# Patient Record
Sex: MALE | Race: WHITE
[De-identification: names, ages, dates, MRNs, and addresses within clinical notes are randomized per-mention and may not be internally consistent; named-entity substitution may affect disease eponyms.]

---

## 2021-11-01 ENCOUNTER — HOSPITAL ENCOUNTER (INPATIENT)
Dept: HOSPITAL 47 - 5NMEDONC | Age: 86
LOS: 8 days | Discharge: NURSING, REHAB OR LONG-TERM CARE | DRG: 256 | End: 2021-11-09
Attending: INTERNAL MEDICINE | Admitting: INTERNAL MEDICINE
Payer: MEDICARE

## 2021-11-01 VITALS — BODY MASS INDEX: 32.1 KG/M2

## 2021-11-01 DIAGNOSIS — G62.9: ICD-10-CM

## 2021-11-01 DIAGNOSIS — L03.031: ICD-10-CM

## 2021-11-01 DIAGNOSIS — Z79.84: ICD-10-CM

## 2021-11-01 DIAGNOSIS — Z95.5: ICD-10-CM

## 2021-11-01 DIAGNOSIS — B95.61: ICD-10-CM

## 2021-11-01 DIAGNOSIS — I11.0: ICD-10-CM

## 2021-11-01 DIAGNOSIS — G89.29: ICD-10-CM

## 2021-11-01 DIAGNOSIS — Z79.51: ICD-10-CM

## 2021-11-01 DIAGNOSIS — I25.2: ICD-10-CM

## 2021-11-01 DIAGNOSIS — F45.42: ICD-10-CM

## 2021-11-01 DIAGNOSIS — I48.91: ICD-10-CM

## 2021-11-01 DIAGNOSIS — L98.9: ICD-10-CM

## 2021-11-01 DIAGNOSIS — Z85.46: ICD-10-CM

## 2021-11-01 DIAGNOSIS — E11.69: ICD-10-CM

## 2021-11-01 DIAGNOSIS — Z79.1: ICD-10-CM

## 2021-11-01 DIAGNOSIS — I50.9: ICD-10-CM

## 2021-11-01 DIAGNOSIS — Z20.822: ICD-10-CM

## 2021-11-01 DIAGNOSIS — E11.628: ICD-10-CM

## 2021-11-01 DIAGNOSIS — M19.90: ICD-10-CM

## 2021-11-01 DIAGNOSIS — E11.649: ICD-10-CM

## 2021-11-01 DIAGNOSIS — Z79.82: ICD-10-CM

## 2021-11-01 DIAGNOSIS — Z95.1: ICD-10-CM

## 2021-11-01 DIAGNOSIS — F41.9: ICD-10-CM

## 2021-11-01 DIAGNOSIS — E11.52: Primary | ICD-10-CM

## 2021-11-01 DIAGNOSIS — F32.9: ICD-10-CM

## 2021-11-01 DIAGNOSIS — I25.10: ICD-10-CM

## 2021-11-01 DIAGNOSIS — N17.9: ICD-10-CM

## 2021-11-01 DIAGNOSIS — Z79.899: ICD-10-CM

## 2021-11-01 DIAGNOSIS — Z95.0: ICD-10-CM

## 2021-11-01 DIAGNOSIS — E11.65: ICD-10-CM

## 2021-11-01 DIAGNOSIS — R45.851: ICD-10-CM

## 2021-11-01 DIAGNOSIS — M86.9: ICD-10-CM

## 2021-11-01 DIAGNOSIS — B95.2: ICD-10-CM

## 2021-11-01 DIAGNOSIS — B96.89: ICD-10-CM

## 2021-11-01 LAB — GLUCOSE BLD-MCNC: 250 MG/DL (ref 75–99)

## 2021-11-01 PROCEDURE — 85025 COMPLETE CBC W/AUTO DIFF WBC: CPT

## 2021-11-01 PROCEDURE — 87186 SC STD MICRODIL/AGAR DIL: CPT

## 2021-11-01 PROCEDURE — 87205 SMEAR GRAM STAIN: CPT

## 2021-11-01 PROCEDURE — 84100 ASSAY OF PHOSPHORUS: CPT

## 2021-11-01 PROCEDURE — 80048 BASIC METABOLIC PNL TOTAL CA: CPT

## 2021-11-01 PROCEDURE — 80162 ASSAY OF DIGOXIN TOTAL: CPT

## 2021-11-01 PROCEDURE — 80053 COMPREHEN METABOLIC PANEL: CPT

## 2021-11-01 PROCEDURE — 87635 SARS-COV-2 COVID-19 AMP PRB: CPT

## 2021-11-01 PROCEDURE — 87077 CULTURE AEROBIC IDENTIFY: CPT

## 2021-11-01 PROCEDURE — 87075 CULTR BACTERIA EXCEPT BLOOD: CPT

## 2021-11-01 PROCEDURE — 82565 ASSAY OF CREATININE: CPT

## 2021-11-01 PROCEDURE — 83735 ASSAY OF MAGNESIUM: CPT

## 2021-11-01 PROCEDURE — 87040 BLOOD CULTURE FOR BACTERIA: CPT

## 2021-11-01 PROCEDURE — 85610 PROTHROMBIN TIME: CPT

## 2021-11-01 PROCEDURE — 87070 CULTURE OTHR SPECIMN AEROBIC: CPT

## 2021-11-01 RX ADMIN — GLIMEPIRIDE SCH MG: 4 TABLET ORAL at 21:59

## 2021-11-01 RX ADMIN — INSULIN ASPART SCH UNIT: 100 INJECTION, SOLUTION INTRAVENOUS; SUBCUTANEOUS at 22:00

## 2021-11-01 RX ADMIN — MORPHINE SULFATE SCH MG: 15 TABLET, EXTENDED RELEASE ORAL at 22:00

## 2021-11-01 RX ADMIN — AMPICILLIN SODIUM AND SULBACTAM SODIUM SCH MLS/HR: 2; 1 INJECTION, POWDER, FOR SOLUTION INTRAMUSCULAR; INTRAVENOUS at 22:32

## 2021-11-01 RX ADMIN — PREGABALIN SCH MG: 75 CAPSULE ORAL at 22:00

## 2021-11-01 RX ADMIN — HEPARIN SODIUM SCH UNIT: 5000 INJECTION INTRAVENOUS; SUBCUTANEOUS at 22:00

## 2021-11-02 LAB
ALBUMIN SERPL-MCNC: 3.4 G/DL (ref 3.5–5)
ALBUMIN/GLOB SERPL: 1.2 {RATIO}
ALP SERPL-CCNC: 40 U/L (ref 38–126)
ALT SERPL-CCNC: 13 U/L (ref 4–49)
ANION GAP SERPL CALC-SCNC: 7 MMOL/L
AST SERPL-CCNC: 29 U/L (ref 17–59)
BASOPHILS # BLD AUTO: 0 K/UL (ref 0–0.2)
BASOPHILS NFR BLD AUTO: 0 %
BUN SERPL-SCNC: 44 MG/DL (ref 9–20)
CALCIUM SPEC-MCNC: 9 MG/DL (ref 8.4–10.2)
CHLORIDE SERPL-SCNC: 98 MMOL/L (ref 98–107)
CO2 SERPL-SCNC: 30 MMOL/L (ref 22–30)
EOSINOPHIL # BLD AUTO: 0.1 K/UL (ref 0–0.7)
EOSINOPHIL NFR BLD AUTO: 1 %
ERYTHROCYTE [DISTWIDTH] IN BLOOD BY AUTOMATED COUNT: 4.77 M/UL (ref 4.3–5.9)
ERYTHROCYTE [DISTWIDTH] IN BLOOD: 14.2 % (ref 11.5–15.5)
GLOBULIN SER CALC-MCNC: 2.9 G/DL
GLUCOSE BLD-MCNC: 123 MG/DL (ref 75–99)
GLUCOSE BLD-MCNC: 182 MG/DL (ref 75–99)
GLUCOSE BLD-MCNC: 228 MG/DL (ref 75–99)
GLUCOSE BLD-MCNC: 56 MG/DL (ref 75–99)
GLUCOSE BLD-MCNC: 61 MG/DL (ref 75–99)
GLUCOSE BLD-MCNC: 62 MG/DL (ref 75–99)
GLUCOSE BLD-MCNC: 62 MG/DL (ref 75–99)
GLUCOSE BLD-MCNC: 91 MG/DL (ref 75–99)
GLUCOSE SERPL-MCNC: 114 MG/DL (ref 74–99)
HCT VFR BLD AUTO: 40.2 % (ref 39–53)
HGB BLD-MCNC: 13 GM/DL (ref 13–17.5)
INR PPP: 1.2 (ref ?–1.2)
LYMPHOCYTES # SPEC AUTO: 0.9 K/UL (ref 1–4.8)
LYMPHOCYTES NFR SPEC AUTO: 14 %
MAGNESIUM SPEC-SCNC: 2.1 MG/DL (ref 1.6–2.3)
MCH RBC QN AUTO: 27.3 PG (ref 25–35)
MCHC RBC AUTO-ENTMCNC: 32.4 G/DL (ref 31–37)
MCV RBC AUTO: 84.3 FL (ref 80–100)
MONOCYTES # BLD AUTO: 0.4 K/UL (ref 0–1)
MONOCYTES NFR BLD AUTO: 7 %
NEUTROPHILS # BLD AUTO: 4.7 K/UL (ref 1.3–7.7)
NEUTROPHILS NFR BLD AUTO: 75 %
PLATELET # BLD AUTO: 131 K/UL (ref 150–450)
POTASSIUM SERPL-SCNC: 3.8 MMOL/L (ref 3.5–5.1)
PROT SERPL-MCNC: 6.3 G/DL (ref 6.3–8.2)
PT BLD: 12.7 SEC (ref 9–12)
SODIUM SERPL-SCNC: 135 MMOL/L (ref 137–145)
WBC # BLD AUTO: 6.2 K/UL (ref 3.8–10.6)

## 2021-11-02 PROCEDURE — 0Y6P0Z0 DETACHMENT AT RIGHT 1ST TOE, COMPLETE, OPEN APPROACH: ICD-10-PCS

## 2021-11-02 RX ADMIN — FENOFIBRATE SCH MG: 160 TABLET ORAL at 09:52

## 2021-11-02 RX ADMIN — AMPICILLIN SODIUM AND SULBACTAM SODIUM SCH MLS/HR: 2; 1 INJECTION, POWDER, FOR SOLUTION INTRAMUSCULAR; INTRAVENOUS at 09:06

## 2021-11-02 RX ADMIN — PANTOPRAZOLE SODIUM SCH: 40 TABLET, DELAYED RELEASE ORAL at 10:19

## 2021-11-02 RX ADMIN — GLIMEPIRIDE SCH MG: 4 TABLET ORAL at 20:52

## 2021-11-02 RX ADMIN — Medication SCH MCG: at 09:55

## 2021-11-02 RX ADMIN — INSULIN ASPART SCH: 100 INJECTION, SOLUTION INTRAVENOUS; SUBCUTANEOUS at 09:05

## 2021-11-02 RX ADMIN — MORPHINE SULFATE SCH: 15 TABLET, EXTENDED RELEASE ORAL at 20:52

## 2021-11-02 RX ADMIN — ATORVASTATIN CALCIUM SCH MG: 20 TABLET, FILM COATED ORAL at 09:48

## 2021-11-02 RX ADMIN — INSULIN ASPART SCH UNIT: 100 INJECTION, SOLUTION INTRAVENOUS; SUBCUTANEOUS at 21:13

## 2021-11-02 RX ADMIN — AMPICILLIN SODIUM AND SULBACTAM SODIUM SCH MLS/HR: 2; 1 INJECTION, POWDER, FOR SOLUTION INTRAMUSCULAR; INTRAVENOUS at 20:50

## 2021-11-02 RX ADMIN — INSULIN ASPART SCH: 100 INJECTION, SOLUTION INTRAVENOUS; SUBCUTANEOUS at 13:35

## 2021-11-02 RX ADMIN — MORPHINE SULFATE SCH MG: 15 TABLET, EXTENDED RELEASE ORAL at 09:50

## 2021-11-02 RX ADMIN — TAMSULOSIN HYDROCHLORIDE SCH MG: 0.4 CAPSULE ORAL at 09:56

## 2021-11-02 RX ADMIN — DIGOXIN SCH MCG: 125 TABLET ORAL at 09:51

## 2021-11-02 RX ADMIN — SODIUM CHLORIDE SCH MLS/HR: 9 INJECTION, SOLUTION INTRAVENOUS at 22:36

## 2021-11-02 RX ADMIN — PREGABALIN SCH MG: 75 CAPSULE ORAL at 09:52

## 2021-11-02 RX ADMIN — INSULIN ASPART SCH: 100 INJECTION, SOLUTION INTRAVENOUS; SUBCUTANEOUS at 17:44

## 2021-11-02 RX ADMIN — GLIMEPIRIDE SCH MG: 4 TABLET ORAL at 09:55

## 2021-11-02 RX ADMIN — PREGABALIN SCH MG: 75 CAPSULE ORAL at 20:51

## 2021-11-02 RX ADMIN — HEPARIN SODIUM SCH UNIT: 5000 INJECTION INTRAVENOUS; SUBCUTANEOUS at 20:51

## 2021-11-02 RX ADMIN — HEPARIN SODIUM SCH: 5000 INJECTION INTRAVENOUS; SUBCUTANEOUS at 07:33

## 2021-11-02 NOTE — OP
OPERATIVE REPORT



PREOPERATIVE DIAGNOSIS:

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



POSTOPERATIVE DIAGNOSIS:

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



PROCEDURE:

Amputation of the right foot big toe at metatarsophalangeal joint.



ANESTHESIA:

IV sedation and local anesthesia.



PROCEDURE DESCRIPTION:

This patient was brought to the operating room.  Right foot was prepped and draped in

usual sterile manner.  This patient has a chronic wound on the right foot big toe with

gangrene changes involving the big toe. Lidocaine plain 1% plain was infiltrated for

the ring block and incision was made, elliptical, on the dorsal aspect of the foot,

went circumferentially around the plantar aspect of the foot, deepened through skin,

fat and fascia.  Then we divided the tendons on the plantar aspect and then on the

dorsal aspect. The big toe was kept in flexion position until we reached the

metatarsophalangeal joint, and ligaments were divided and the big toe was removed.

There were some bleeding points which were suture-ligated with 4-0 Prolene.  Wound was

copiously irrigated with hydrogen peroxide and saline. Incision was closed in 2 layers

using 0 Vicryl and skin was closed with 3-0 nylon with interrupted suture. Dressing was

applied.  The patient tolerated the procedure well and was transferred to the recovery

room in satisfactory condition.





MMODL / IJN: [id number] / Job#: [id number]

## 2021-11-02 NOTE — HP
HISTORY AND PHYSICAL



ADDENDUM TO HISTORY AND PHYSICAL:

ADDENDUM: Right foot gangrene for right foot big toe amputation.





MMODL / IJN: [id number] / Job#: [id number]

## 2021-11-02 NOTE — P.GSCN
History of Present Illness


History of present illness: 





86-year-old gentleman who is been admitted to the hospital with gangrene of the 

left foot big toe.  According to patient he was treated for wound on his left 

big toe at the wound clinic for the last few months is getting worse patient has

been admitted I was consulted for evaluation and treatment his left big toe has 

a gangrene of the some redness on the dorsal aspect of the foot





Medical history history of coronary artery disease patient had a CABG and stent 

placed in the past





On examination neck is supple no bruit appreciated





Chest is clear first and second sound normal good and both lungs





Abdomen is soft non-tender





Vascular examination brachial radial and femoral pulses are present dorsal pedis

is present patient has a left big toe gangrene with some redness noted on the d

orsal suspect the foot





Plan is left big toe amputation risk and complication discussed we will arrange 

for surgery keep patient nothing by mouth





Past Medical History


Past Medical History: Heart Failure, Diabetes Mellitus, Eye Disorder, 

Hypertension, Myocardial Infarction (MI), Prostate Disorder, Skin Disorder


Additional Past Medical History / Comment(s): MI's, stent, nueropathy, prostate 

cancer, cellulitis, chronic back pain


Last Myocardial Infarction Date:: Pt unaware


History of Any Multi-Drug Resistant Organisms: None Reported


Past Surgical History: Back Surgery, Cholecystectomy, Heart Catheterization, 

Heart Catheterization With Stent, Orthopedic Surgery, Pacemaker, Tonsillectomy


Additional Past Surgical History / Comment(s): stents, pacemaker w/def


Past Anesthesia/Blood Transfusion Reactions: No Reported Reaction


Date of Last Stent Placement:: 3/18/21


Type of Cardiac Device: Permanent Pacemaker


Device Placement Date:: 11/10/17


Past Psychological History: Depression


Smoking Status: Never smoker


Past Alcohol Use History: None Reported


Past Drug Use History: None Reported





Medications and Allergies


                                Home Medications











 Medication  Instructions  Recorded  Confirmed  Type


 


Aspirin 81 mg PO DAILY 11/01/21 11/01/21 History


 


Blue Emu Otc Cream 1 applic TOPICAL DAILY PRN 11/01/21 11/01/21 History


 


Bumetanide [BUMEX] 0.5 mg PO DAILY 11/01/21 11/01/21 History


 


Celecoxib [CeleBREX] 100 mg PO BID PRN 11/01/21 11/01/21 History


 


Cholecalciferol [Vitamin D3 (25 25 mcg PO DAILY 11/01/21 11/01/21 History





Mcg = 1000 Iu)]    


 


DULoxetine HCL [Cymbalta] 30 mg PO DAILY 11/01/21 11/01/21 History


 


Digoxin [Lanoxin] 125 mcg PO DAILY 11/01/21 11/01/21 History


 


Fenofibric Acid (Choline) 135 mg PO DAILY 11/01/21 11/01/21 History





[Trilipix]    


 


Glimepiride [Amaryl] 4 mg PO BID 11/01/21 11/01/21 History


 


Lidocaine 5% Patch [Lidoderm] 1 patch TOPICAL DAILY PRN 11/01/21 11/01/21 

History


 


Mometasone/Formoterol [Dulera 100 1 puff PO RT-BID 11/01/21 11/01/21 History





Mcg-5 Mcg Inhaler]    


 


Morphine Sulfate 15 mg PO BID 11/01/21 11/01/21 History


 


Multivitamins, Thera [Multivitamin 1 tab PO DAILY 11/01/21 11/01/21 History





(formulary)]    


 


Pantoprazole [Protonix] 40 mg PO DAILY 11/01/21 11/01/21 History


 


Pregabalin [Lyrica] 75 mg PO BID 11/01/21 11/01/21 History


 


Rosuvastatin [Crestor] 10 mg PO DAILY 11/01/21 11/01/21 History


 


Tamsulosin HCl [Flomax] 0.4 mg PO DAILY 11/01/21 11/01/21 History


 


carvediloL [Coreg] 3.125 mg PO BID 11/01/21 11/01/21 History


 


traZODone HCL [TraZODone HCl] 50 mg PO HS 11/01/21 11/01/21 History








                                    Allergies











Allergy/AdvReac Type Severity Reaction Status Date / Time


 


contrast dye Allergy  Unknown Uncoded 11/01/21 21:04














Surgical - Exam


                                   Vital Signs











Temp Pulse Resp BP Pulse Ox


 


 98.5 F   81   18   125/67   96 


 


 11/01/21 18:11  11/01/21 18:11  11/01/21 18:11  11/01/21 18:11  11/01/21 18:11














Results





- Labs





                                 11/02/21 03:57





                                 11/02/21 03:57


                  Abnormal Lab Results - Last 24 Hours (Table)











  11/01/21 11/01/21 11/02/21 Range/Units





  19:07 20:27 03:57 


 


Plt Count    131 L  (150-450)  k/uL


 


Lymphocytes #    0.9 L  (1.0-4.8)  k/uL


 


PT     (9.0-12.0)  sec


 


INR     (<1.2)  


 


Sodium     (137-145)  mmol/L


 


BUN     (9-20)  mg/dL


 


Creatinine  1.51 H    (0.66-1.25)  mg/dL


 


Glucose     (74-99)  mg/dL


 


POC Glucose (mg/dL)   250 H   (75-99)  mg/dL


 


Albumin     (3.5-5.0)  g/dL














  11/02/21 11/02/21 Range/Units





  03:57 03:57 


 


Plt Count    (150-450)  k/uL


 


Lymphocytes #    (1.0-4.8)  k/uL


 


PT  12.7 H   (9.0-12.0)  sec


 


INR  1.2 H   (<1.2)  


 


Sodium   135 L  (137-145)  mmol/L


 


BUN   44 H  (9-20)  mg/dL


 


Creatinine   1.62 H  (0.66-1.25)  mg/dL


 


Glucose   114 H  (74-99)  mg/dL


 


POC Glucose (mg/dL)    (75-99)  mg/dL


 


Albumin   3.4 L  (3.5-5.0)  g/dL








                                 Diabetes panel











  11/01/21 11/02/21 Range/Units





  19:07 03:57 


 


Sodium   135 L  (137-145)  mmol/L


 


Potassium   3.8  (3.5-5.1)  mmol/L


 


Chloride   98  ()  mmol/L


 


Carbon Dioxide   30  (22-30)  mmol/L


 


BUN   44 H  (9-20)  mg/dL


 


Creatinine  1.51 H  1.62 H  (0.66-1.25)  mg/dL


 


Glucose   114 H  (74-99)  mg/dL


 


Calcium   9.0  (8.4-10.2)  mg/dL


 


AST   29  (17-59)  U/L


 


ALT   13  (4-49)  U/L


 


Alkaline Phosphatase   40  ()  U/L


 


Total Protein   6.3  (6.3-8.2)  g/dL


 


Albumin   3.4 L  (3.5-5.0)  g/dL








                                  Calcium panel











  11/02/21 Range/Units





  03:57 


 


Calcium  9.0  (8.4-10.2)  mg/dL


 


Phosphorus  3.0  (2.5-4.5)  mg/dL


 


Albumin  3.4 L  (3.5-5.0)  g/dL








                                 Pituitary panel











  11/01/21 11/02/21 Range/Units





  19:07 03:57 


 


Sodium   135 L  (137-145)  mmol/L


 


Potassium   3.8  (3.5-5.1)  mmol/L


 


Chloride   98  ()  mmol/L


 


Carbon Dioxide   30  (22-30)  mmol/L


 


BUN   44 H  (9-20)  mg/dL


 


Creatinine  1.51 H  1.62 H  (0.66-1.25)  mg/dL


 


Glucose   114 H  (74-99)  mg/dL


 


Calcium   9.0  (8.4-10.2)  mg/dL








                                  Adrenal panel











  11/01/21 11/02/21 Range/Units





  19:07 03:57 


 


Sodium   135 L  (137-145)  mmol/L


 


Potassium   3.8  (3.5-5.1)  mmol/L


 


Chloride   98  ()  mmol/L


 


Carbon Dioxide   30  (22-30)  mmol/L


 


BUN   44 H  (9-20)  mg/dL


 


Creatinine  1.51 H  1.62 H  (0.66-1.25)  mg/dL


 


Glucose   114 H  (74-99)  mg/dL


 


Calcium   9.0  (8.4-10.2)  mg/dL


 


Total Bilirubin   1.2  (0.2-1.3)  mg/dL


 


AST   29  (17-59)  U/L


 


ALT   13  (4-49)  U/L


 


Alkaline Phosphatase   40  ()  U/L


 


Total Protein   6.3  (6.3-8.2)  g/dL


 


Albumin   3.4 L  (3.5-5.0)  g/dL

## 2021-11-02 NOTE — P.HPIM
History of Present Illness


H&P Date: 11/02/21





This is an 86-year-old male who was recently admitted to [facility] for 

increasing pain of the right great toe and concern for cellulitis with gangrene.

 Patient was transferred here to [facility] for further evaluation and 

initially started on IV Unasyn and will continue with Unasyn and vancomycin and 

consult infectious disease.  Vascular surgery Dr. Champagne also consulted for 

possible amputation of the right great toe.  Patient states he has been 

following at the wound care center in [place] who has been treating the foot 

infection and has progressively gotten worse in transferred here for further 

evaluation.  Patient follows with  in the outpatient setting.  Juany keller has a past medical history of heart failure, atrial fibrillation, CAD, 

arthritis, anxiety, depression diabetes mellitus type 2, I disorder, 

hypertension, myocardial infarction with stents in March 2021, permanent 

pacemaker placement in 2017 prostate cancer, and chronic back pain. patient also

follows at the pain clinic every 2 months for his chronic back pain and is 

prescribed MS Contin.  Patient denies having any right foot pain but his 

continued with chronic back pain during this hospitalization.  Right great toe 

is positive for necrotic tissue and eschar noted along with some surrounding 

cellulitis with redness and inflammation that is extending up the dorsal aspect 

of the right foot and stops midfoot.  Plan is for amputation of the right great 

toe with vascular surgery this afternoon.  Initial labs show a white blood count

of 6.2, hemoglobin is 13.0, platelets are 131, INR is 1.2, sodium is 135, 

potassium is 3.8, creatinine is 1.62, and BUN is 44, calcium is 9.0, phosphorus 

is 3.0, magnesium is 2.1, liver functions within normal limits.





Review of Systems


Constitutional: Denies chills, Denies fever


Eyes: bilateral decreased vision (Cataracts)


Ears, nose, mouth and throat: Denies headache, Denies sore throat


Cardiovascular: Denies chest pain, Denies shortness of breath


Respiratory: Denies cough


Gastrointestinal: Denies abdominal pain, Denies diarrhea, Denies nausea, Denies 

vomiting


Musculoskeletal: Reports low back pain (Chronic back pain, sees pain management 

outpatient)


Integumentary: Reports color changes, Reports darkening of skin, Reports 

foot/leg ulcers


Neurological: Denies numbness, Denies weakness


Psychiatric: Denies anxiety, Denies depression


Endocrine: Denies fatigue, Denies weight change





Past Medical History


Past Medical History: Cancer, Heart Failure, Diabetes Mellitus, Eye Disorder, 

Hypertension, Myocardial Infarction (MI), Prostate Disorder, Skin Disorder


Additional Past Medical History / Comment(s): MI's, stent, nueropathy, prostate 

cancer, cellulitis, chronic back pain


Last Myocardial Infarction Date:: Pt unaware


History of Any Multi-Drug Resistant Organisms: None Reported


Past Surgical History: Back Surgery, Cholecystectomy, Heart Catheterization, 

Heart Catheterization With Stent, Orthopedic Surgery, Pacemaker, Tonsillectomy


Additional Past Surgical History / Comment(s): stents, pacemaker w/def


Past Anesthesia/Blood Transfusion Reactions: No Reported Reaction


Date of Last Stent Placement:: 3/18/21


Type of Cardiac Device: Permanent Pacemaker


Device Placement Date:: 11/10/17


Past Psychological History: Depression


Smoking Status: Never smoker


Past Alcohol Use History: None Reported


Past Drug Use History: None Reported





Medications and Allergies


                                Home Medications











 Medication  Instructions  Recorded  Confirmed  Type


 


Aspirin 81 mg PO DAILY 11/01/21 11/01/21 History


 


Blue Emu Otc Cream 1 applic TOPICAL DAILY PRN 11/01/21 11/01/21 History


 


Bumetanide [BUMEX] 0.5 mg PO DAILY 11/01/21 11/01/21 History


 


Celecoxib [CeleBREX] 100 mg PO BID PRN 11/01/21 11/01/21 History


 


Cholecalciferol [Vitamin D3 (25 25 mcg PO DAILY 11/01/21 11/01/21 History





Mcg = 1000 Iu)]    


 


DULoxetine HCL [Cymbalta] 30 mg PO DAILY 11/01/21 11/01/21 History


 


Digoxin [Lanoxin] 125 mcg PO DAILY 11/01/21 11/01/21 History


 


Fenofibric Acid (Choline) 135 mg PO DAILY 11/01/21 11/01/21 History





[Trilipix]    


 


Glimepiride [Amaryl] 4 mg PO BID 11/01/21 11/01/21 History


 


Lidocaine 5% Patch [Lidoderm] 1 patch TOPICAL DAILY PRN 11/01/21 11/01/21 

History


 


Mometasone/Formoterol [Dulera 100 1 puff PO RT-BID 11/01/21 11/01/21 History





Mcg-5 Mcg Inhaler]    


 


Morphine Sulfate 15 mg PO BID 11/01/21 11/01/21 History


 


Multivitamins, Thera [Multivitamin 1 tab PO DAILY 11/01/21 11/01/21 History





(formulary)]    


 


Pantoprazole [Protonix] 40 mg PO DAILY 11/01/21 11/01/21 History


 


Pregabalin [Lyrica] 75 mg PO BID 11/01/21 11/01/21 History


 


Rosuvastatin [Crestor] 10 mg PO DAILY 11/01/21 11/01/21 History


 


Tamsulosin HCl [Flomax] 0.4 mg PO DAILY 11/01/21 11/01/21 History


 


carvediloL [Coreg] 3.125 mg PO BID 11/01/21 11/01/21 History


 


traZODone HCL [TraZODone HCl] 50 mg PO HS 11/01/21 11/01/21 History








                                    Allergies











Allergy/AdvReac Type Severity Reaction Status Date / Time


 


contrast dye Allergy  Unknown Uncoded 11/01/21 21:04














Physical Exam


Vitals: 


                                   Vital Signs











  Temp Pulse Resp BP Pulse Ox


 


 11/02/21 05:00  98.6 F  80  18  96/60  94 L


 


 11/01/21 20:17    18  


 


 11/01/21 20:00  99.2 F  79  16  125/71  95


 


 11/01/21 18:11  98.5 F  81  18  125/67  96








                                Intake and Output











 11/01/21 11/02/21 11/02/21





 22:59 06:59 14:59


 


Intake Total  225 


 


Balance  225 


 


Intake:   


 


  Intake, IV Titration  225 





  Amount   


 


    Ampicillin-Sulbactam 3 gm  100 





    In Sodium Chloride 0.9%   





    100 ml @ 200 mls/hr IVPB   





    Q12HR AUNDREA Rx#:[id number]   


 


    Vancomycin 1,500 mg In  125 





    Sodium Chloride 0.9% 250   





    ml @ 125 mls/hr IVPB Q24H   





    AUNDREA Rx#:[id number]   


 


Other:   


 


  Voiding Method Toilet  





 Urinal  


 


  Weight 86 kg  














Gen: This is a 86-year-old male awake, alert and oriented 3, well-developed, 

well-nourished.


HEENT: Head is atraumatic, normocephalic. Pupils equal, round. Sclerae is 

anicteric. 


NECK: Supple. No JVD. No lymphadenopathy. No thyromegaly. 


LUNGS: Clear to auscultation. No wheezes or rhonchi.  No intercostal 

retractions.


HEART: S1, S2 are muffled


ABDOMEN: Soft. Bowel sounds are present. No masses.  No tenderness.


EXTREMITIES: Right foot erythema along with some swelling noted at the dorsal 

aspect of the right foot that travels to the mid center of the foot along with 

some necrotic tissue and eschar noted at the right great toe with some clear 

drainage noted as well.  No calf tenderness.


NEUROLOGICAL: Patient is awake, alert and oriented x3. Cranial nerves 2 through 

12 are grossly intact. 





Results


CBC & Chem 7: 


                                 11/02/21 03:57





                                 11/02/21 03:57


Labs: 


                  Abnormal Lab Results - Last 24 Hours (Table)











  11/01/21 11/01/21 11/02/21 Range/Units





  19:07 20:27 03:57 


 


Plt Count    131 L  (150-450)  k/uL


 


Lymphocytes #    0.9 L  (1.0-4.8)  k/uL


 


PT     (9.0-12.0)  sec


 


INR     (<1.2)  


 


Sodium     (137-145)  mmol/L


 


BUN     (9-20)  mg/dL


 


Creatinine  1.51 H    (0.66-1.25)  mg/dL


 


Glucose     (74-99)  mg/dL


 


POC Glucose (mg/dL)   250 H   (75-99)  mg/dL


 


Albumin     (3.5-5.0)  g/dL














  11/02/21 11/02/21 Range/Units





  03:57 03:57 


 


Plt Count    (150-450)  k/uL


 


Lymphocytes #    (1.0-4.8)  k/uL


 


PT  12.7 H   (9.0-12.0)  sec


 


INR  1.2 H   (<1.2)  


 


Sodium   135 L  (137-145)  mmol/L


 


BUN   44 H  (9-20)  mg/dL


 


Creatinine   1.62 H  (0.66-1.25)  mg/dL


 


Glucose   114 H  (74-99)  mg/dL


 


POC Glucose (mg/dL)    (75-99)  mg/dL


 


Albumin   3.4 L  (3.5-5.0)  g/dL














Thrombosis Risk Factor Assmnt





- DVT/VTE Prophylaxis


DVT/VTE Prophylaxis: Pharmacologic Prophylaxis ordered





- Choose All That Apply


Any of the Below Risk Factors Present?: No


Each Risk Factor Represents 2 Points: Malignancy


Each Risk Factor Represents 3 Points: Age 75 years or older


Other congenital or acquired thrombophilia - If yes, enter type in comment: No


Thrombosis Risk Factor Assessment Total Risk Factor Score: 5


Thrombosis Risk Factor Assessment Level: High Risk





Assessment and Plan


Assessment: 





Right great toe gangrene with surrounding cellulitis


Diabetes mellitus type 2


Acute kidney injury most likely prerenal


Possible osteomyelitis of the right toe


Chronic low back pain


Chronic heart failure, EF unknown


Hypertension


Peripheral neuropathy


Permanent pacemaker placement


Past medical history of myocardial infarction with stent placement


History of anxiety/depression





Plan:





Recommend to continue with current medications and patient is currently nothing 

by mouth and monitor blood sugars before meals and at bedtime closely as patient

had a blood sugar reading of 62 secondary to being nothing by mouth for surgery 

this afternoon.  Patient was given an amp of D50 and will monitor closely.  

Resume diet when surgery is complete.  Infectious disease consulted and patient 

is maintained on IV Unasyn along with IV vancomycin.  Vascular surgery Dr. Champagne consulted and evaluated the patient and patient will undergo amputation 

of the right great toe this afternoon secondary to gangrene and will await 

surgical report.  Patient does have a past medical history of heart failure and 

states his kidney functions are chronically 1.6 1.7 and takes Bumex and will 

hold for now and possibly resume in the morning.  Other appropriate home 

medications have been resumed and will monitor closely. 


Time with Patient: Greater than 30

## 2021-11-03 LAB
ANION GAP SERPL CALC-SCNC: 9 MMOL/L
BASOPHILS # BLD AUTO: 0 K/UL (ref 0–0.2)
BASOPHILS NFR BLD AUTO: 0 %
BUN SERPL-SCNC: 37 MG/DL (ref 9–20)
CALCIUM SPEC-MCNC: 9.1 MG/DL (ref 8.4–10.2)
CHLORIDE SERPL-SCNC: 99 MMOL/L (ref 98–107)
CO2 SERPL-SCNC: 28 MMOL/L (ref 22–30)
EOSINOPHIL # BLD AUTO: 0.1 K/UL (ref 0–0.7)
EOSINOPHIL NFR BLD AUTO: 2 %
ERYTHROCYTE [DISTWIDTH] IN BLOOD BY AUTOMATED COUNT: 4.86 M/UL (ref 4.3–5.9)
ERYTHROCYTE [DISTWIDTH] IN BLOOD: 13.9 % (ref 11.5–15.5)
GLUCOSE BLD-MCNC: 184 MG/DL (ref 75–99)
GLUCOSE BLD-MCNC: 202 MG/DL (ref 75–99)
GLUCOSE BLD-MCNC: 211 MG/DL (ref 75–99)
GLUCOSE BLD-MCNC: 333 MG/DL (ref 75–99)
GLUCOSE SERPL-MCNC: 208 MG/DL (ref 74–99)
HCT VFR BLD AUTO: 42.5 % (ref 39–53)
HGB BLD-MCNC: 13.3 GM/DL (ref 13–17.5)
LYMPHOCYTES # SPEC AUTO: 0.8 K/UL (ref 1–4.8)
LYMPHOCYTES NFR SPEC AUTO: 11 %
MCH RBC QN AUTO: 27.3 PG (ref 25–35)
MCHC RBC AUTO-ENTMCNC: 31.2 G/DL (ref 31–37)
MCV RBC AUTO: 87.4 FL (ref 80–100)
MONOCYTES # BLD AUTO: 0.6 K/UL (ref 0–1)
MONOCYTES NFR BLD AUTO: 7 %
NEUTROPHILS # BLD AUTO: 6.1 K/UL (ref 1.3–7.7)
NEUTROPHILS NFR BLD AUTO: 78 %
PLATELET # BLD AUTO: 140 K/UL (ref 150–450)
POTASSIUM SERPL-SCNC: 4.4 MMOL/L (ref 3.5–5.1)
SODIUM SERPL-SCNC: 136 MMOL/L (ref 137–145)
WBC # BLD AUTO: 7.9 K/UL (ref 3.8–10.6)

## 2021-11-03 RX ADMIN — HEPARIN SODIUM SCH UNIT: 5000 INJECTION INTRAVENOUS; SUBCUTANEOUS at 10:06

## 2021-11-03 RX ADMIN — TAMSULOSIN HYDROCHLORIDE SCH MG: 0.4 CAPSULE ORAL at 09:40

## 2021-11-03 RX ADMIN — HYDROCODONE BITARTRATE AND ACETAMINOPHEN PRN EACH: 5; 325 TABLET ORAL at 08:30

## 2021-11-03 RX ADMIN — GLIMEPIRIDE SCH MG: 4 TABLET ORAL at 09:37

## 2021-11-03 RX ADMIN — AMPICILLIN SODIUM AND SULBACTAM SODIUM SCH MLS/HR: 2; 1 INJECTION, POWDER, FOR SOLUTION INTRAMUSCULAR; INTRAVENOUS at 10:06

## 2021-11-03 RX ADMIN — AMPICILLIN SODIUM AND SULBACTAM SODIUM SCH MLS/HR: 2; 1 INJECTION, POWDER, FOR SOLUTION INTRAMUSCULAR; INTRAVENOUS at 19:46

## 2021-11-03 RX ADMIN — Medication SCH MCG: at 09:40

## 2021-11-03 RX ADMIN — DIGOXIN SCH MCG: 125 TABLET ORAL at 09:40

## 2021-11-03 RX ADMIN — MORPHINE SULFATE SCH MG: 15 TABLET, EXTENDED RELEASE ORAL at 04:31

## 2021-11-03 RX ADMIN — FENOFIBRATE SCH MG: 160 TABLET ORAL at 09:40

## 2021-11-03 RX ADMIN — MORPHINE SULFATE SCH MG: 15 TABLET, EXTENDED RELEASE ORAL at 20:36

## 2021-11-03 RX ADMIN — SODIUM CHLORIDE SCH MLS/HR: 9 INJECTION, SOLUTION INTRAVENOUS at 20:36

## 2021-11-03 RX ADMIN — GLIMEPIRIDE SCH MG: 4 TABLET ORAL at 20:36

## 2021-11-03 RX ADMIN — ATORVASTATIN CALCIUM SCH MG: 20 TABLET, FILM COATED ORAL at 09:37

## 2021-11-03 RX ADMIN — INSULIN ASPART SCH UNIT: 100 INJECTION, SOLUTION INTRAVENOUS; SUBCUTANEOUS at 20:37

## 2021-11-03 RX ADMIN — HEPARIN SODIUM SCH UNIT: 5000 INJECTION INTRAVENOUS; SUBCUTANEOUS at 20:36

## 2021-11-03 RX ADMIN — INSULIN ASPART SCH UNIT: 100 INJECTION, SOLUTION INTRAVENOUS; SUBCUTANEOUS at 18:07

## 2021-11-03 RX ADMIN — SENNOSIDES SCH: 8.6 TABLET, FILM COATED ORAL at 18:08

## 2021-11-03 RX ADMIN — HYDROCODONE BITARTRATE AND ACETAMINOPHEN PRN EACH: 5; 325 TABLET ORAL at 19:11

## 2021-11-03 RX ADMIN — PREGABALIN SCH MG: 75 CAPSULE ORAL at 20:37

## 2021-11-03 RX ADMIN — PANTOPRAZOLE SODIUM SCH MG: 40 TABLET, DELAYED RELEASE ORAL at 08:24

## 2021-11-03 RX ADMIN — INSULIN DETEMIR SCH UNIT: 100 INJECTION, SOLUTION SUBCUTANEOUS at 20:37

## 2021-11-03 RX ADMIN — PREGABALIN SCH MG: 75 CAPSULE ORAL at 09:39

## 2021-11-03 RX ADMIN — INSULIN ASPART SCH UNIT: 100 INJECTION, SOLUTION INTRAVENOUS; SUBCUTANEOUS at 13:38

## 2021-11-03 RX ADMIN — INSULIN ASPART SCH UNIT: 100 INJECTION, SOLUTION INTRAVENOUS; SUBCUTANEOUS at 10:07

## 2021-11-03 NOTE — P.CONS
History of Present Illness





- Reason for Consult


Consult date: 11/02/21


right big toe gangrene


Requesting physician: Juli Camargo





- Chief Complaint


right big toe non healing wound and discoloration x weeks





- History of Present Illness


History of Present Illness : Patient is 86-year  male with a past 

medical history significant for right big toe wound that started few months ago 

and the patient has been following at the wound care center [facility], patient was

noticed to have worsening of his right big toe wound with more necrotic changes 

for the patient went to [facility] and the patient subsequently has been 

transferred to [facility] for further management, patient did have 

underlying neuropathy hands denies significant pain however mention on the last 

few days has been more of a pressure-like with some dull aching 2-3 of 10 no 

radiation however has been concerned more about the black discoloration of his 

big toe with some redness that has been spreading on the dorsum aspect of his 

right foot patient denies having any foul-smelling drainage and denies high-

grade fever on presentation the hospital the patient was afebrile patient did 

have normal white count he did have elevated BUN/creatinine liver enzymes are 

normal patient be seen by vascular surgery and plan for possible amputation of 

the right big toe this afternoon infectious disease was consulted for management

of antibiotic therapy currently on Unasyn and vancomycin














Review of system:


 CONSTITUTIONAL:  Positive for weakness denies high-grade fever.


EYES:  No complaint.


ENT:  No complaint.


RESPIRATORY:  No complaint.


CARDIOVASCULAR:  No complaint.


GENITOURINARY:  No complaint.


GASTROINTESTINAL:  No complaint.


MUSCULOSKELETAL: As per history of present illness.


INTEGUMENTARY : No complaint.


PSYCHOLOGIC: No complaint.


ENDOCRINE: No complaint.


NEUROLOGIC:  No complaint.








Past medical history : Reviewed, documented below


Past surgical history : Reviewed, documented below


Social history: Reviewed, documented below


Medications: Reviewed, as documented below








EXAMINATION:


Vital sigans=  Reviewed and documented below


GENERAL DESCRIPTION: Elderly male lying in bed, no distress. No tachypnea or 

accessory muscle of respiration use.


HEENT: Shows Pallor , no scleral icterus. Oral mucous membrane is dry.


NECK: Trachea central, no thyromegaly.


LUNGS: Unlabored breathing. Clear to auscultation anteriorly. No wheeze or 

crackle.


HEART: S1, S2, regular rate and rhythm.


ABDOMEN: Soft, no tenderness , guarding or rigidity


EXTREMITIES: No edema feet, right big toe with necrotic changes significant 

swelling with redness that has been spreading on the dorsum aspect of the right 

foot


SKIN: No rash, no masses palpable.


NEUROLOGICAL: The patient is awake, alert, oriented x3, mood and affect normal.





LABS AND RADIOLOGY: Reviewed results see below





Assessment : Patient with right big toe gangrene in this patient symptoms 

started with a wound to the right big toe few months ago seem to have failed to 

respond to the outpatient conservative therapy now with concern for gangrene and

significant cellulitis will need to cover for the polymicrobial bacteria that 

can be associated with this type of infection











Plan: 1-vancomycin pharmacy to dose target trough of 15 while watching  kidney 

function and vancomycin trough closely


2-Unasyn 3 g every 6 hours


3-await amputation and deep cultures


We will follow on clinical condition and cultures to further adjust medication 

if needed


Thank you for this consultation we will follow the patient along with you











Past Medical History


Past Medical History: Heart Failure, Diabetes Mellitus, Eye Disorder, 

Hypertension, Myocardial Infarction (MI), Prostate Disorder, Skin Disorder


Additional Past Medical History / Comment(s): MI's, stent, nueropathy, prostate 

cancer, cellulitis, chronic back pain


Last Myocardial Infarction Date:: Pt unaware


History of Any Multi-Drug Resistant Organisms: None Reported


Past Surgical History: Back Surgery, Cholecystectomy, Heart Catheterization, 

Heart Catheterization With Stent, Orthopedic Surgery, Pacemaker, Tonsillectomy


Additional Past Surgical History / Comment(s): stents, pacemaker w/def


Past Anesthesia/Blood Transfusion Reactions: No Reported Reaction


Date of Last Stent Placement:: 3/18/21


Type of Cardiac Device: Permanent Pacemaker


Device Placement Date:: 11/10/17


Past Psychological History: Depression


Smoking Status: Never smoker


Past Alcohol Use History: None Reported


Past Drug Use History: None Reported





Medications and Allergies


                                Home Medications











 Medication  Instructions  Recorded  Confirmed  Type


 


Aspirin 81 mg PO DAILY 11/01/21 11/01/21 History


 


Blue Emu Otc Cream 1 applic TOPICAL DAILY PRN 11/01/21 11/01/21 History


 


Bumetanide [BUMEX] 0.5 mg PO DAILY 11/01/21 11/01/21 History


 


Celecoxib [CeleBREX] 100 mg PO BID PRN 11/01/21 11/01/21 History


 


Cholecalciferol [Vitamin D3 (25 25 mcg PO DAILY 11/01/21 11/01/21 History





Mcg = 1000 Iu)]    


 


DULoxetine HCL [Cymbalta] 30 mg PO DAILY 11/01/21 11/01/21 History


 


Digoxin [Lanoxin] 125 mcg PO DAILY 11/01/21 11/01/21 History


 


Fenofibric Acid (Choline) 135 mg PO DAILY 11/01/21 11/01/21 History





[Trilipix]    


 


Glimepiride [Amaryl] 4 mg PO BID 11/01/21 11/01/21 History


 


Lidocaine 5% Patch [Lidoderm] 1 patch TOPICAL DAILY PRN 11/01/21 11/01/21 

History


 


Mometasone/Formoterol [Dulera 100 1 puff PO RT-BID 11/01/21 11/01/21 History





Mcg-5 Mcg Inhaler]    


 


Morphine Sulfate 15 mg PO BID 11/01/21 11/01/21 History


 


Multivitamins, Thera [Multivitamin 1 tab PO DAILY 11/01/21 11/01/21 History





(formulary)]    


 


Pantoprazole [Protonix] 40 mg PO DAILY 11/01/21 11/01/21 History


 


Pregabalin [Lyrica] 75 mg PO BID 11/01/21 11/01/21 History


 


Rosuvastatin [Crestor] 10 mg PO DAILY 11/01/21 11/01/21 History


 


Tamsulosin HCl [Flomax] 0.4 mg PO DAILY 11/01/21 11/01/21 History


 


carvediloL [Coreg] 3.125 mg PO BID 11/01/21 11/01/21 History


 


traZODone HCL [TraZODone HCl] 50 mg PO HS 11/01/21 11/01/21 History








                                    Allergies











Allergy/AdvReac Type Severity Reaction Status Date / Time


 


contrast dye Allergy  Unknown Uncoded 11/02/21 14:52














Physical Exam


Vitals: 


                                   Vital Signs











  Temp Pulse Resp BP Pulse Ox


 


 11/02/21 05:00  98.6 F  80  18  96/60  94 L


 


 11/01/21 20:17    18  


 


 11/01/21 20:00  99.2 F  79  16  125/71  95


 


 11/01/21 18:11  98.5 F  81  18  125/67  96








                                Intake and Output











 11/01/21 11/02/21 11/02/21





 22:59 06:59 14:59


 


Intake Total  225 


 


Balance  225 


 


Intake:   


 


  Intake, IV Titration  225 





  Amount   


 


    Ampicillin-Sulbactam 3 gm  100 





    In Sodium Chloride 0.9%   





    100 ml @ 200 mls/hr IVPB   





    Q12HR [facility] Rx#:[id number]   


 


    Vancomycin 1,500 mg In  125 





    Sodium Chloride 0.9% 250   





    ml @ 125 mls/hr IVPB Q24H   





    [facility] Rx#:[id number]   


 


Other:   


 


  Voiding Method Toilet  Toilet





 Urinal  Urinal


 


  Weight 86 kg  














Results


CBC & Chem 7: 


                                 11/03/21 09:09





                                 11/03/21 09:09


Labs: 


                  Abnormal Lab Results - Last 24 Hours (Table)











  11/01/21 11/01/21 11/02/21 Range/Units





  19:07 20:27 03:57 


 


Plt Count    131 L  (150-450)  k/uL


 


Lymphocytes #    0.9 L  (1.0-4.8)  k/uL


 


PT     (9.0-12.0)  sec


 


INR     (<1.2)  


 


Sodium     (137-145)  mmol/L


 


BUN     (9-20)  mg/dL


 


Creatinine  1.51 H    (0.66-1.25)  mg/dL


 


Glucose     (74-99)  mg/dL


 


POC Glucose (mg/dL)   250 H   (75-99)  mg/dL


 


Albumin     (3.5-5.0)  g/dL














  11/02/21 11/02/21 Range/Units





  03:57 03:57 


 


Plt Count    (150-450)  k/uL


 


Lymphocytes #    (1.0-4.8)  k/uL


 


PT  12.7 H   (9.0-12.0)  sec


 


INR  1.2 H   (<1.2)  


 


Sodium   135 L  (137-145)  mmol/L


 


BUN   44 H  (9-20)  mg/dL


 


Creatinine   1.62 H  (0.66-1.25)  mg/dL


 


Glucose   114 H  (74-99)  mg/dL


 


POC Glucose (mg/dL)    (75-99)  mg/dL


 


Albumin   3.4 L  (3.5-5.0)  g/dL

## 2021-11-03 NOTE — P.PN
Subjective


Progress Note Date: 11/03/21








This is an 86-year-old male who was recently admitted to [facility] for 

increasing pain of the right great toe and concern for cellulitis with gangrene.

 Patient was transferred here to [facility] for further evaluation and 

initially started on IV Unasyn and will continue with Unasyn and vancomycin and 

consult infectious disease.  Vascular surgery Dr. Champagne also consulted for 

possible amputation of the right great toe.  Patient states he has been 

following at the wound care center in [place] who has been treating the foot 

infection and has progressively gotten worse in transferred here for further 

evaluation.  Patient follows with  in the outpatient setting.  

Patient has a past medical history of heart failure, atrial fibrillation, CAD, 

arthritis, anxiety, depression diabetes mellitus type 2, I disorder, 

hypertension, myocardial infarction with stents in March 2021, permanent 

pacemaker placement in 2017 prostate cancer, and chronic back pain. patient also

follows at the pain clinic every 2 months for his chronic back pain and is 

prescribed MS Contin.  Patient denies having any right foot pain but his 

continued with chronic back pain during this hospitalization.  Right great toe 

is positive for necrotic tissue and eschar noted along with some surrounding ce

llulitis with redness and inflammation that is extending up the dorsal aspect of

the right foot and stops midfoot.  Plan is for amputation of the right great toe

with vascular surgery this afternoon.  Initial labs show a white blood count of 

6.2, hemoglobin is 13.0, platelets are 131, INR is 1.2, sodium is 135, potassium

is 3.8, creatinine is 1.62, and BUN is 44, calcium is 9.0, phosphorus is 3.0, 

magnesium is 2.1, liver functions within normal limits.





11/03/2021


Patient is seen in follow-up status post right great toe amputation with 

vascular surgery Dr. Champagne yesterday.  Patient is having some back pain and 

normally maintained on morphine sulfate and will add low-dose Norco for breakthr

ough pain.  Patient has peripheral neuropathy and normally does not feel much of

his feet and states since the surgery he is feeling some surrounding tenderness 

of the right foot.  Surgical dressing is Intact.  Blood sugars are mildly 

elevated and have resumed home medications and will continue sliding scale and 

add low-dose long-acting and continued Accu-Cheks before meals and at bedtime.





Labs:


WBC is 7.9, hemoglobin is 13.3, platelets are 140, sodium is 136, potassium is 

4.4, BUN is 37, creatinine is 1.4 to, calcium is 9.1








Review of systems:


Constitutional: No reports of fatigue, fever, or chills


Cardiovascular: No reports of chest pain or palpitations


Respiratory: No reports of shortness of breath or cough


GI: No reports of nausea, vomiting, or diarrhea


: No reports of dysuria or retention


Neurovascular: No reports of weakness or numbness





All medications have been reviewed





Active Medications





Acetaminophen (Acetaminophen Tab 325 Mg Tab)  650 mg PO Q6HR PRN


   PRN Reason: Mild Pain or Fever > 100.5


Hydrocodone Bitart/Acetaminophen (Hydrocodone/Apap 5-325mg 1 Each Tab)  1 each 

PO Q6HR PRN


   PRN Reason: Pain


   Last Admin: 11/03/21 08:30 Dose:  1 each


   Documented by: 


Atorvastatin Calcium (Atorvastatin 20 Mg Tab)  20 mg PO DAILY [facility]


   Last Admin: 11/03/21 09:37 Dose:  20 mg


   Documented by: 


Carvedilol (Carvedilol 3.125 Mg Tab)  3.125 mg PO BID-W/MEALS [facility]


   Last Admin: 11/03/21 08:23 Dose:  3.125 mg


   Documented by: 


Cholecalciferol (Cholecalciferol 25 Mcg (1000 Iu) Tablet)  25 mcg PO DAILY [facility]


   Last Admin: 11/03/21 09:40 Dose:  25 mcg


   Documented by: 


Digoxin (Digoxin 125 Mcg Tab)  125 mcg PO DAILY [facility]


   Last Admin: 11/03/21 09:40 Dose:  125 mcg


   Documented by: 


Fenofibrate (Fenofibrate 160 Mg Tab)  160 mg PO DAILY [facility]


   Last Admin: 11/03/21 09:40 Dose:  160 mg


   Documented by: 


Glimepiride (Glimepiride 4 Mg Tab)  4 mg PO BID [facility]


   Last Admin: 11/03/21 09:37 Dose:  4 mg


   Documented by: 


Heparin Sodium (Porcine) (Heparin Sodium,Porcine/Pf 5,000 Unit/0.5 Ml Syringe)  

5,000 unit SQ Q12HR [facility]


   Last Admin: 11/03/21 10:06 Dose:  5,000 unit


   Documented by: 


Ampicillin Sodium/Sulbactam (Sodium 3 gm/ Sodium Chloride)  100 mls @ 200 mls/hr

IVPB Q12HR [facility]


   Last Admin: 11/03/21 10:06 Dose:  200 mls/hr


   Documented by: 


Vancomycin HCl 1,500 mg/ (Sodium Chloride)  250 mls @ 125 mls/hr IVPB Q24H [facility]


   Last Admin: 11/02/21 22:36 Dose:  125 mls/hr


   Documented by: 


Insulin Aspart (Insulin Aspart (Novolog) 100 Unit/Ml Vial)  0 unit SQ ACHS [facility]; 

Protocol


   Last Admin: 11/03/21 13:38 Dose:  6 unit


   Documented by: 


Insulin Detemir (Insulin Detemir (Levemir) 100 Unit/Ml Syr)  15 unit SQ HS [facility]


Melatonin (Melatonin 3 Mg Tablet)  3 mg PO HS PRN


   PRN Reason: Insomnia


Morphine Sulfate (Morphine Sulfate Er 15 Mg Tablet)  15 mg PO BID [facility]; Protocol


   Last Admin: 11/03/21 04:31 Dose:  15 mg


   Documented by: 


Naloxone HCl (Naloxone 0.4 Mg/Ml 1 Ml Vial)  0.2 mg IV Q2M PRN


   PRN Reason: Opioid Reversal


Ondansetron HCl (Ondansetron 4 Mg/2 Ml Vial)  4 mg IVP Q8HR PRN


   PRN Reason: Nausea And Vomiting


Pantoprazole Sodium (Pantoprazole 40 Mg Tablet)  40 mg PO [id number]-[id number] [facility]


   Last Admin: 11/03/21 08:24 Dose:  40 mg


   Documented by: 


Pregabalin (Pregabalin 75 Mg Cap)  75 mg PO BID [facility]


   Last Admin: 11/03/21 09:39 Dose:  75 mg


   Documented by: 


Tamsulosin HCl (Tamsulosin 0.4 Mg Cap.Er.24h)  0.4 mg PO DAILY [facility]


   Last Admin: 11/03/21 09:40 Dose:  0.4 mg


   Documented by: 


Trazodone HCl (Trazodone Hcl 50 Mg Tab)  50 mg PO [facility]


   Last Admin: 11/02/21 20:52 Dose:  50 mg


   Documented by: 





Physical exam:





Gen: This is a 86-year-old male awake, alert and oriented 3, well-developed, 

well-nourished.  Currently sitting up in the chair with bilateral lower 

extremities elevated


HEENT: Head is atraumatic, normocephalic. Pupils equal, round. Sclerae is 

anicteric. 


NECK: Supple. No JVD. No lymphadenopathy. No thyromegaly. 


LUNGS: Clear to auscultation. No wheezes or rhonchi.  No intercostal 

retractions.


HEART: S1, S2 are muffled


ABDOMEN: Soft. Bowel sounds are present. No masses.  No tenderness.


EXTREMITIES: Right foot status post right great toe amputation and surgical 

dressing is dry and intact.  No calf tenderness.


NEUROLOGICAL: Patient is awake, alert and oriented x3. Cranial nerves 2 through 

12 are grossly intact. 





Assessment:





Right great toe gangrene with surrounding cellulitis


Status post right great toe amputation


Diabetes mellitus type 2, uncontrolled with hypoglycemia


Acute kidney injury most likely prerenal, improving


Possible osteomyelitis of the right toe


Chronic low back pain


Chronic heart failure, EF unknown


Hypertension


Peripheral neuropathy


Permanent pacemaker placement


Past medical history of myocardial infarction with stent placement


History of anxiety/depression


GI prophylaxis: Protonix


DVT prophylaxis: Subcutaneous heparin


No code





Plan:





Recommend to continue with current medications and patient is status post right 

great toe amputation.  Infectious disease following and patient is maintained on

IV Unasyn along with IV vancomycin.  Vascular surgery Dr. Champagne following as 

well.  Awaiting for deep tissue cultures to determine discharge antibiotics.  

Patient's blood sugars are mildly elevated and will continue with Accu-Cheks 

before meals and at bedtime and sliding scale and will add low-dose long-acting 

and monitor closely.  Repeat labs in a.m.  








Objective





- Vital Signs


Vital signs: 


                                   Vital Signs











Temp  98.1 F   11/03/21 06:30


 


Pulse  79   11/03/21 06:30


 


Resp  16   11/03/21 06:30


 


BP  127/80   11/03/21 06:30


 


Pulse Ox  96   11/03/21 05:00








                                 Intake & Output











 11/02/21 11/03/21 11/03/21





 18:59 06:59 18:59


 


Intake Total 800  


 


Output Total 10 200 


 


Balance 790 -200 


 


Weight 86 kg  


 


Intake:   


 


    


 


  Intake, IV Titration 100  





  Amount   


 


    Ampicillin-Sulbactam 3 gm 100  





    In Sodium Chloride 0.9%   





    100 ml @ 200 mls/hr IVPB   





    Q12HR [facility] Rx#:[id number]   


 


Output:   


 


  Urine  200 


 


  Estimated Blood Loss 10  


 


Other:   


 


  Voiding Method Toilet Toilet 





 Urinal Urinal 














- Labs


CBC & Chem 7: 


                                 11/03/21 09:09





                                 11/03/21 09:09


Labs: 


                  Abnormal Lab Results - Last 24 Hours (Table)











  11/02/21 11/02/21 11/02/21 Range/Units





  12:02 12:30 16:39 


 


POC Glucose (mg/dL)  62 L  182 H  61 L  (75-99)  mg/dL














  11/02/21 11/02/21 11/02/21 Range/Units





  16:40 17:14 17:42 


 


POC Glucose (mg/dL)  56 L  62 L  123 H  (75-99)  mg/dL














  11/02/21 11/03/21 Range/Units





  20:58 07:28 


 


POC Glucose (mg/dL)  228 H  202 H  (75-99)  mg/dL








                      Microbiology - Last 24 Hours (Table)











 11/02/21 15:30 Tissue Culture - Preliminary





 Toe - Right First 


 


 11/02/21 15:30 Anaerobic Culture - Preliminary





 Toe - Right First 


 


 11/02/21 15:30 Anaerobic Culture - Preliminary





 Toe - Right First 


 


 11/02/21 15:30 Anaerobic Culture - Preliminary





 Toe - Right First 


 


 11/02/21 15:30 Wound Culture - Preliminary





 Toe - Right First 


 


 11/02/21 15:30 Wound Culture - Preliminary





 Toe - Right First 


 


 11/01/21 19:07 Blood Culture - Preliminary





 Blood    No Growth after 24 hours

## 2021-11-04 LAB
ANION GAP SERPL CALC-SCNC: 6 MMOL/L
BUN SERPL-SCNC: 32 MG/DL (ref 9–20)
CALCIUM SPEC-MCNC: 9 MG/DL (ref 8.4–10.2)
CHLORIDE SERPL-SCNC: 100 MMOL/L (ref 98–107)
CO2 SERPL-SCNC: 31 MMOL/L (ref 22–30)
GLUCOSE BLD-MCNC: 135 MG/DL (ref 75–99)
GLUCOSE BLD-MCNC: 156 MG/DL (ref 75–99)
GLUCOSE BLD-MCNC: 232 MG/DL (ref 75–99)
GLUCOSE BLD-MCNC: 288 MG/DL (ref 75–99)
GLUCOSE SERPL-MCNC: 127 MG/DL (ref 74–99)
POTASSIUM SERPL-SCNC: 4.1 MMOL/L (ref 3.5–5.1)
SODIUM SERPL-SCNC: 137 MMOL/L (ref 137–145)

## 2021-11-04 RX ADMIN — SENNOSIDES SCH: 8.6 TABLET, FILM COATED ORAL at 08:20

## 2021-11-04 RX ADMIN — SENNOSIDES SCH: 8.6 TABLET, FILM COATED ORAL at 22:38

## 2021-11-04 RX ADMIN — GLIMEPIRIDE SCH MG: 4 TABLET ORAL at 22:33

## 2021-11-04 RX ADMIN — AMPICILLIN SODIUM AND SULBACTAM SODIUM SCH MLS/HR: 2; 1 INJECTION, POWDER, FOR SOLUTION INTRAMUSCULAR; INTRAVENOUS at 15:20

## 2021-11-04 RX ADMIN — HYDROCODONE BITARTRATE AND ACETAMINOPHEN PRN EACH: 5; 325 TABLET ORAL at 15:57

## 2021-11-04 RX ADMIN — AMPICILLIN SODIUM AND SULBACTAM SODIUM SCH MLS/HR: 2; 1 INJECTION, POWDER, FOR SOLUTION INTRAMUSCULAR; INTRAVENOUS at 22:34

## 2021-11-04 RX ADMIN — GLIMEPIRIDE SCH MG: 4 TABLET ORAL at 08:15

## 2021-11-04 RX ADMIN — PREGABALIN SCH MG: 75 CAPSULE ORAL at 08:14

## 2021-11-04 RX ADMIN — AMPICILLIN SODIUM AND SULBACTAM SODIUM SCH MLS/HR: 2; 1 INJECTION, POWDER, FOR SOLUTION INTRAMUSCULAR; INTRAVENOUS at 08:16

## 2021-11-04 RX ADMIN — PREGABALIN SCH MG: 75 CAPSULE ORAL at 22:33

## 2021-11-04 RX ADMIN — TAMSULOSIN HYDROCHLORIDE SCH MG: 0.4 CAPSULE ORAL at 08:15

## 2021-11-04 RX ADMIN — INSULIN ASPART SCH UNIT: 100 INJECTION, SOLUTION INTRAVENOUS; SUBCUTANEOUS at 13:09

## 2021-11-04 RX ADMIN — HEPARIN SODIUM SCH UNIT: 5000 INJECTION INTRAVENOUS; SUBCUTANEOUS at 09:32

## 2021-11-04 RX ADMIN — PANTOPRAZOLE SODIUM SCH MG: 40 TABLET, DELAYED RELEASE ORAL at 08:14

## 2021-11-04 RX ADMIN — INSULIN ASPART SCH UNIT: 100 INJECTION, SOLUTION INTRAVENOUS; SUBCUTANEOUS at 18:16

## 2021-11-04 RX ADMIN — ATORVASTATIN CALCIUM SCH MG: 20 TABLET, FILM COATED ORAL at 08:14

## 2021-11-04 RX ADMIN — DIGOXIN SCH MCG: 125 TABLET ORAL at 08:15

## 2021-11-04 RX ADMIN — INSULIN ASPART SCH UNIT: 100 INJECTION, SOLUTION INTRAVENOUS; SUBCUTANEOUS at 08:15

## 2021-11-04 RX ADMIN — MORPHINE SULFATE SCH MG: 15 TABLET, EXTENDED RELEASE ORAL at 22:32

## 2021-11-04 RX ADMIN — FENOFIBRATE SCH MG: 160 TABLET ORAL at 08:15

## 2021-11-04 RX ADMIN — INSULIN DETEMIR SCH UNIT: 100 INJECTION, SOLUTION SUBCUTANEOUS at 22:39

## 2021-11-04 RX ADMIN — HEPARIN SODIUM SCH UNIT: 5000 INJECTION INTRAVENOUS; SUBCUTANEOUS at 22:34

## 2021-11-04 RX ADMIN — Medication SCH MCG: at 08:14

## 2021-11-04 RX ADMIN — INSULIN ASPART SCH UNIT: 100 INJECTION, SOLUTION INTRAVENOUS; SUBCUTANEOUS at 22:38

## 2021-11-04 RX ADMIN — MORPHINE SULFATE SCH MG: 15 TABLET, EXTENDED RELEASE ORAL at 08:15

## 2021-11-04 NOTE — P.PN
Subjective


Progress Note Date: 11/04/21








This is an 86-year-old male who was recently admitted to [facility] for 

increasing pain of the right great toe and concern for cellulitis with gangrene.

 Patient was transferred here to [facility] for further evaluation and 

initially started on IV Unasyn and will continue with Unasyn and vancomycin and 

consult infectious disease.  Vascular surgery Dr. Champagne also consulted for 

possible amputation of the right great toe.  Patient states he has been 

following at the wound care center in [place] who has been treating the foot 

infection and has progressively gotten worse in transferred here for further 

evaluation.  Patient follows with  in the outpatient setting.  

Patient has a past medical history of heart failure, atrial fibrillation, CAD, 

arthritis, anxiety, depression diabetes mellitus type 2, I disorder, 

hypertension, myocardial infarction with stents in March 2021, permanent 

pacemaker placement in 2017 prostate cancer, and chronic back pain. patient also

follows at the pain clinic every 2 months for his chronic back pain and is 

prescribed MS Contin.  Patient denies having any right foot pain but his 

continued with chronic back pain during this hospitalization.  Right great toe 

is positive for necrotic tissue and eschar noted along with some surrounding ce

llulitis with redness and inflammation that is extending up the dorsal aspect of

the right foot and stops midfoot.  Plan is for amputation of the right great toe

with vascular surgery this afternoon.  Initial labs show a white blood count of 

6.2, hemoglobin is 13.0, platelets are 131, INR is 1.2, sodium is 135, potassium

is 3.8, creatinine is 1.62, and BUN is 44, calcium is 9.0, phosphorus is 3.0, 

magnesium is 2.1, liver functions within normal limits.





11/03/2021


Patient is seen in follow-up status post right great toe amputation with 

vascular surgery Dr. Champagne yesterday.  Patient is having some back pain and 

normally maintained on morphine sulfate and will add low-dose Norco for breakthr

ough pain.  Patient has peripheral neuropathy and normally does not feel much of

his feet and states since the surgery he is feeling some surrounding tenderness 

of the right foot.  Surgical dressing is Intact.  Blood sugars are mildly 

elevated and have resumed home medications and will continue sliding scale and 

add low-dose long-acting and continued Accu-Cheks before meals and at bedtime.





11/04/2021


Patient is seen and evaluated in follow-up this morning with no acute overnight 

issues.  Patient underwent right great toe amputation and is being followed by 

vascular surgery Dr. Champagne along with infectious disease.  Preliminary 

cultures showing presumptive staph aureus with gram-negative bacilli and group D

enterococcus and patient is maintained on IV antibiotics in the form of Unasyn 

and vancomycin and will continue.  Awaiting for finalized cultures to determine 

discharge antibiotics.  PT/OT to evaluate the patient for possible ECF placement

and will discuss further with patient after he discusses with his family members

about this as he wants to go home.





Labs:


Sodium is 137, potassium is 4.1, BUN is 32, creatinine is 1.23, calcium is 9.0





Review of systems:


Constitutional: No reports of fatigue, fever, or chills


Cardiovascular: No reports of chest pain or palpitations


Respiratory: No reports of shortness of breath or cough


GI: No reports of nausea, vomiting, or diarrhea


: No reports of dysuria or retention


Neurovascular: No reports of weakness or numbness





All medications have been reviewed





Active Medications





Acetaminophen (Acetaminophen Tab 325 Mg Tab)  650 mg PO Q6HR PRN


   PRN Reason: Mild Pain or Fever > 100.5


Hydrocodone Bitart/Acetaminophen (Hydrocodone/Apap 5-325mg 1 Each Tab)  1 each 

PO Q6HR PRN


   PRN Reason: Pain


   Last Admin: 11/03/21 19:11 Dose:  1 each


   Documented by: 


Atorvastatin Calcium (Atorvastatin 20 Mg Tab)  20 mg PO DAILY [facility]


   Last Admin: 11/04/21 08:14 Dose:  20 mg


   Documented by: 


Carvedilol (Carvedilol 3.125 Mg Tab)  3.125 mg PO BID-W/MEALS [facility]


   Last Admin: 11/04/21 08:15 Dose:  3.125 mg


   Documented by: 


Cholecalciferol (Cholecalciferol 25 Mcg (1000 Iu) Tablet)  25 mcg PO DAILY [facility]


   Last Admin: 11/04/21 08:14 Dose:  25 mcg


   Documented by: 


Digoxin (Digoxin 125 Mcg Tab)  125 mcg PO DAILY [facility]


   Last Admin: 11/04/21 08:15 Dose:  125 mcg


   Documented by: 


Fenofibrate (Fenofibrate 160 Mg Tab)  160 mg PO DAILY [facility]


   Last Admin: 11/04/21 08:15 Dose:  160 mg


   Documented by: 


Glimepiride (Glimepiride 4 Mg Tab)  4 mg PO BID [facility]


   Last Admin: 11/04/21 08:15 Dose:  4 mg


   Documented by: 


Heparin Sodium (Porcine) (Heparin Sodium,Porcine/Pf 5,000 Unit/0.5 Ml Syringe)  

5,000 unit SQ Q12HR [facility]


   Last Admin: 11/04/21 09:32 Dose:  5,000 unit


   Documented by: 


Vancomycin HCl 1,500 mg/ (Sodium Chloride)  250 mls @ 125 mls/hr IVPB Q24H [facility]


   Last Admin: 11/03/21 20:36 Dose:  125 mls/hr


   Documented by: 


Ampicillin Sodium/Sulbactam (Sodium 3 gm/ Sodium Chloride)  100 mls @ 200 mls/hr

IVPB Q6H [facility]


Insulin Aspart (Insulin Aspart (Novolog) 100 Unit/Ml Vial)  0 unit SQ ACHS [facility]; 

Protocol


   Last Admin: 11/04/21 13:09 Dose:  1 unit


   Documented by: 


Insulin Detemir (Insulin Detemir (Levemir) 100 Unit/Ml Syr)  15 unit SQ HS [facility]


   Last Admin: 11/03/21 20:37 Dose:  15 unit


   Documented by: 


Melatonin (Melatonin 3 Mg Tablet)  3 mg PO HS PRN


   PRN Reason: Insomnia


Miscellaneous Information (Vancomycin Trough Due 1 Each Misc)  0 each MISCELLANE

AS DIRECTED ONE


   Stop: 11/05/21 22:01


Morphine Sulfate (Morphine Sulfate Er 15 Mg Tablet)  15 mg PO BID [facility]; Protocol


   Last Admin: 11/04/21 08:15 Dose:  15 mg


   Documented by: 


Naloxone HCl (Naloxone 0.4 Mg/Ml 1 Ml Vial)  0.2 mg IV Q2M PRN


   PRN Reason: Opioid Reversal


Ondansetron HCl (Ondansetron 4 Mg/2 Ml Vial)  4 mg IVP Q8HR PRN


   PRN Reason: Nausea And Vomiting


Pantoprazole Sodium (Pantoprazole 40 Mg Tablet)  40 mg PO AC-BRKFST [facility]


   Last Admin: 11/04/21 08:14 Dose:  40 mg


   Documented by: 


Pregabalin (Pregabalin 75 Mg Cap)  75 mg PO BID [facility]


   Last Admin: 11/04/21 08:14 Dose:  75 mg


   Documented by: 


Senna (Sennosides 8.6 Mg Tab)  8.6 mg PO BID [facility]


   Last Admin: 11/04/21 08:20 Dose:  Not Given


   Documented by: 


Tamsulosin HCl (Tamsulosin 0.4 Mg Cap.Er.24h)  0.4 mg PO DAILY [facility]


   Last Admin: 11/04/21 08:15 Dose:  0.4 mg


   Documented by: 


Trazodone HCl (Trazodone Hcl 50 Mg Tab)  50 mg PO HS [facility]


   Last Admin: 11/03/21 20:36 Dose:  50 mg


   Documented by: 





Physical exam:





Gen: This is a 86-year-old male awake, alert and oriented 3, well-developed, 

well-nourished.  Currently sitting up in the bed


HEENT: Head is atraumatic, normocephalic. Pupils equal, round. Sclerae is 

anicteric. 


NECK: Supple. No JVD. No lymphadenopathy. No thyromegaly. 


LUNGS: Clear to auscultation. No wheezes or rhonchi.  No intercostal 

retractions.


HEART: S1, S2 are muffled


ABDOMEN: Soft. Bowel sounds are present. No masses.  No tenderness.


EXTREMITIES: Right foot status post right great toe amputation and surgical 

dressing is dry and intact.  No calf tenderness.


NEUROLOGICAL: Patient is awake, alert and oriented x3. Cranial nerves 2 through 

12 are grossly intact. 





Assessment:





Right great toe gangrene with surrounding cellulitis


Status post right great toe amputation


Diabetes mellitus type 2, uncontrolled with hyperglycemia


Acute kidney injury most likely prerenal, improving


Possible osteomyelitis of the right toe


Chronic low back pain


Chronic heart failure, EF unknown


Hypertension


Peripheral neuropathy


Permanent pacemaker placement


Past medical history of myocardial infarction with stent placement


History of anxiety/depression


GI prophylaxis: Protonix


DVT prophylaxis: Subcutaneous heparin


No code





Plan:





Recommend to continue with current medications and patient is status post right 

great toe amputation.  Infectious disease following and patient is maintained on

IV Unasyn along with IV vancomycin.  Preliminary cultures showing presumptive 

staph aureus with gram-negative bacilli and group D enterococcus and awaiting 

finalized cultures.  Vascular surgery Dr. Champagne following as well.  Plan is 

for dressing changes today.  We'll have PT/OT evaluate the patient for possible 

ECF placement.  Will discuss further with infectious disease about discharge 

planning and possible IV antibiotic therapy. Patient states he would like to go 

home on discharge and will discuss further with case management and social work.








Objective





- Vital Signs


Vital signs: 


                                   Vital Signs











Temp  98.5 F   11/04/21 07:21


 


Pulse  82   11/04/21 07:21


 


Resp  16   11/04/21 07:21


 


BP  104/66   11/04/21 07:21


 


Pulse Ox  93 L  11/04/21 05:00








                                 Intake & Output











 11/03/21 11/04/21 11/04/21





 18:59 06:59 18:59


 


Intake Total 700  


 


Output Total 550  


 


Balance 150  


 


Intake:   


 


  Intake, IV Titration 100  





  Amount   


 


    Ampicillin-Sulbactam 3 gm 100  





    In Sodium Chloride 0.9%   





    100 ml @ 200 mls/hr IVPB   





    Q12HR [facility] Rx#:[id number]   


 


  Oral 600  


 


Output:   


 


  Urine 550  


 


Other:   


 


  Voiding Method Toilet Toilet 





 Urinal Urinal 


 


  # Voids 3 3 


 


  # Bowel Movements 1  














- Labs


CBC & Chem 7: 


                                 11/03/21 09:09





                                 11/04/21 08:44


Labs: 


                  Abnormal Lab Results - Last 24 Hours (Table)











  11/03/21 11/03/21 11/03/21 Range/Units





  09:09 09:09 12:31 


 


Plt Count  140 L    (150-450)  k/uL


 


Lymphocytes #  0.8 L    (1.0-4.8)  k/uL


 


Sodium   136 L   (137-145)  mmol/L


 


BUN   37 H   (9-20)  mg/dL


 


Creatinine   1.42 H   (0.66-1.25)  mg/dL


 


Glucose   208 H   (74-99)  mg/dL


 


POC Glucose (mg/dL)    333 H  (75-99)  mg/dL














  11/03/21 11/03/21 11/04/21 Range/Units





  17:43 20:32 07:09 


 


Plt Count     (150-450)  k/uL


 


Lymphocytes #     (1.0-4.8)  k/uL


 


Sodium     (137-145)  mmol/L


 


BUN     (9-20)  mg/dL


 


Creatinine     (0.66-1.25)  mg/dL


 


Glucose     (74-99)  mg/dL


 


POC Glucose (mg/dL)  211 H  184 H  156 H  (75-99)  mg/dL








                      Microbiology - Last 24 Hours (Table)











 11/02/21 15:30 Gram Stain - Preliminary





 Toe - Right First Tissue Culture - Preliminary





    Presumptive Staph aureus





    Gram Neg Bacilli





    Group D Enterococcus


 


 11/02/21 15:30 Gram Stain - Preliminary





 Toe - Right First Wound Culture - Preliminary





    Presumptive Staph aureus





    Gram Neg Bacilli


 


 11/02/21 15:30 Gram Stain - Preliminary





 Toe - Right First Wound Culture - Preliminary





    Presumptive Staph aureus





    Gram Neg Bacilli


 


 11/01/21 19:07 Blood Culture - Preliminary





 Blood    No Growth after 48 hours

## 2021-11-05 LAB
ANION GAP SERPL CALC-SCNC: 7 MMOL/L
BUN SERPL-SCNC: 29 MG/DL (ref 9–20)
CALCIUM SPEC-MCNC: 8.8 MG/DL (ref 8.4–10.2)
CHLORIDE SERPL-SCNC: 105 MMOL/L (ref 98–107)
CO2 SERPL-SCNC: 26 MMOL/L (ref 22–30)
GLUCOSE BLD-MCNC: 124 MG/DL (ref 75–99)
GLUCOSE BLD-MCNC: 166 MG/DL (ref 75–99)
GLUCOSE BLD-MCNC: 183 MG/DL (ref 75–99)
GLUCOSE BLD-MCNC: 83 MG/DL (ref 75–99)
GLUCOSE SERPL-MCNC: 87 MG/DL (ref 74–99)
POTASSIUM SERPL-SCNC: 4.3 MMOL/L (ref 3.5–5.1)
SODIUM SERPL-SCNC: 138 MMOL/L (ref 137–145)

## 2021-11-05 RX ADMIN — HEPARIN SODIUM SCH UNIT: 5000 INJECTION INTRAVENOUS; SUBCUTANEOUS at 09:13

## 2021-11-05 RX ADMIN — INSULIN DETEMIR SCH UNIT: 100 INJECTION, SOLUTION SUBCUTANEOUS at 21:04

## 2021-11-05 RX ADMIN — AMPICILLIN SODIUM AND SULBACTAM SODIUM SCH MLS/HR: 2; 1 INJECTION, POWDER, FOR SOLUTION INTRAMUSCULAR; INTRAVENOUS at 21:05

## 2021-11-05 RX ADMIN — AMPICILLIN SODIUM AND SULBACTAM SODIUM SCH MLS/HR: 2; 1 INJECTION, POWDER, FOR SOLUTION INTRAMUSCULAR; INTRAVENOUS at 13:52

## 2021-11-05 RX ADMIN — PREGABALIN SCH MG: 75 CAPSULE ORAL at 08:42

## 2021-11-05 RX ADMIN — DIGOXIN SCH MCG: 125 TABLET ORAL at 08:45

## 2021-11-05 RX ADMIN — HYDROCODONE BITARTRATE AND ACETAMINOPHEN PRN EACH: 5; 325 TABLET ORAL at 11:43

## 2021-11-05 RX ADMIN — PREGABALIN SCH MG: 75 CAPSULE ORAL at 21:20

## 2021-11-05 RX ADMIN — SENNOSIDES SCH MG: 8.6 TABLET, FILM COATED ORAL at 21:20

## 2021-11-05 RX ADMIN — HEPARIN SODIUM SCH UNIT: 5000 INJECTION INTRAVENOUS; SUBCUTANEOUS at 21:04

## 2021-11-05 RX ADMIN — INSULIN ASPART SCH UNIT: 100 INJECTION, SOLUTION INTRAVENOUS; SUBCUTANEOUS at 21:03

## 2021-11-05 RX ADMIN — PANTOPRAZOLE SODIUM SCH MG: 40 TABLET, DELAYED RELEASE ORAL at 08:43

## 2021-11-05 RX ADMIN — GLIMEPIRIDE SCH MG: 4 TABLET ORAL at 21:21

## 2021-11-05 RX ADMIN — AMPICILLIN SODIUM AND SULBACTAM SODIUM SCH MLS/HR: 2; 1 INJECTION, POWDER, FOR SOLUTION INTRAMUSCULAR; INTRAVENOUS at 08:44

## 2021-11-05 RX ADMIN — MORPHINE SULFATE SCH MG: 15 TABLET, EXTENDED RELEASE ORAL at 08:42

## 2021-11-05 RX ADMIN — SODIUM CHLORIDE SCH MLS/HR: 9 INJECTION, SOLUTION INTRAVENOUS at 00:01

## 2021-11-05 RX ADMIN — INSULIN ASPART SCH: 100 INJECTION, SOLUTION INTRAVENOUS; SUBCUTANEOUS at 12:27

## 2021-11-05 RX ADMIN — FENOFIBRATE SCH MG: 160 TABLET ORAL at 08:43

## 2021-11-05 RX ADMIN — ATORVASTATIN CALCIUM SCH MG: 20 TABLET, FILM COATED ORAL at 08:42

## 2021-11-05 RX ADMIN — INSULIN ASPART SCH: 100 INJECTION, SOLUTION INTRAVENOUS; SUBCUTANEOUS at 07:47

## 2021-11-05 RX ADMIN — MORPHINE SULFATE SCH MG: 15 TABLET, EXTENDED RELEASE ORAL at 21:20

## 2021-11-05 RX ADMIN — TAMSULOSIN HYDROCHLORIDE SCH MG: 0.4 CAPSULE ORAL at 08:43

## 2021-11-05 RX ADMIN — CIPROFLOXACIN SCH MG: 500 TABLET, FILM COATED ORAL at 21:21

## 2021-11-05 RX ADMIN — GLIMEPIRIDE SCH MG: 4 TABLET ORAL at 08:44

## 2021-11-05 RX ADMIN — AMPICILLIN SODIUM AND SULBACTAM SODIUM SCH MLS/HR: 2; 1 INJECTION, POWDER, FOR SOLUTION INTRAMUSCULAR; INTRAVENOUS at 03:12

## 2021-11-05 RX ADMIN — Medication SCH MCG: at 08:46

## 2021-11-05 RX ADMIN — CIPROFLOXACIN SCH MG: 500 TABLET, FILM COATED ORAL at 11:40

## 2021-11-05 RX ADMIN — SENNOSIDES SCH MG: 8.6 TABLET, FILM COATED ORAL at 08:41

## 2021-11-05 RX ADMIN — INSULIN ASPART SCH UNIT: 100 INJECTION, SOLUTION INTRAVENOUS; SUBCUTANEOUS at 17:50

## 2021-11-05 NOTE — P.PN
Subjective


Progress Note Date: 11/05/21








This is an 86-year-old male who was recently admitted to [facility] for 

increasing pain of the right great toe and concern for cellulitis with gangrene.

 Patient was transferred here to [facility] for further evaluation and 

initially started on IV Unasyn and will continue with Unasyn and vancomycin and 

consult infectious disease.  Vascular surgery Dr. Champagne also consulted for 

possible amputation of the right great toe.  Patient states he has been 

following at the wound care center in [place] who has been treating the foot 

infection and has progressively gotten worse in transferred here for further 

evaluation.  Patient follows with  in the outpatient setting.  

Patient has a past medical history of heart failure, atrial fibrillation, CAD, 

arthritis, anxiety, depression diabetes mellitus type 2, I disorder, 

hypertension, myocardial infarction with stents in March 2021, permanent 

pacemaker placement in 2017 prostate cancer, and chronic back pain. patient also

follows at the pain clinic every 2 months for his chronic back pain and is 

prescribed MS Contin.  Patient denies having any right foot pain but his 

continued with chronic back pain during this hospitalization.  Right great toe 

is positive for necrotic tissue and eschar noted along with some surrounding ce

llulitis with redness and inflammation that is extending up the dorsal aspect of

the right foot and stops midfoot.  Plan is for amputation of the right great toe

with vascular surgery this afternoon.  Initial labs show a white blood count of 

6.2, hemoglobin is 13.0, platelets are 131, INR is 1.2, sodium is 135, potassium

is 3.8, creatinine is 1.62, and BUN is 44, calcium is 9.0, phosphorus is 3.0, 

magnesium is 2.1, liver functions within normal limits.





11/03/2021


Patient is seen in follow-up status post right great toe amputation with 

vascular surgery Dr. Champagne yesterday.  Patient is having some back pain and 

normally maintained on morphine sulfate and will add low-dose Norco for breakthr

ough pain.  Patient has peripheral neuropathy and normally does not feel much of

his feet and states since the surgery he is feeling some surrounding tenderness 

of the right foot.  Surgical dressing is Intact.  Blood sugars are mildly 

elevated and have resumed home medications and will continue sliding scale and 

add low-dose long-acting and continued Accu-Cheks before meals and at bedtime.





11/04/2021


Patient is seen and evaluated in follow-up this morning with no acute overnight 

issues.  Patient underwent right great toe amputation and is being followed by 

vascular surgery Dr. Champagne along with infectious disease.  Preliminary 

cultures showing presumptive staph aureus with gram-negative bacilli and group D

enterococcus and patient is maintained on IV antibiotics in the form of Unasyn 

and vancomycin and will continue.  Awaiting for finalized cultures to determine 

discharge antibiotics.  PT/OT to evaluate the patient for possible ECF placement

and will discuss further with patient after he discusses with his family members

about this as he wants to go home.





11/05/2021


Patient is seen in follow-up this morning continued on IV antibiotics in the 

form of Unasyn with infectious disease following closely and vancomycin has been

discontinued.  Patient also continued on oral Cipro and awaiting finalized the 

tissue cultures.  Presumptive staph aureus and Aeromonas hydrophilia preliminary

showing on the wound culture and tissue culture showing presumptive staph aureus

with group D enterococcus and gram-negative bacilli.  Dr. Champagne also following

and planning on dressing changes today.  Vital signs within stable and blood 

sugars have been variable until today lower and will continue with sliding scale

along with long-acting and continue Accu-Cheks before meals and at bedtime.  

Patient denies any chest pain, shortness of breath, or palpitations.  Patient is

having low-grade intermittent fevers of 99.0-99.4.  Patient denies any nausea or

vomiting and tolerating diet.





Labs:


Sodium is 138, potassium is 4.3, BUN is 29, creatinine is 1.20, calcium is 8.8





Review of systems:


Constitutional: No reports of fatigue, fever, or chills


Cardiovascular: No reports of chest pain or palpitations


Respiratory: No reports of shortness of breath or cough


GI: No reports of nausea, vomiting, or diarrhea


: No reports of dysuria or retention


Neurovascular: No reports of weakness or numbness





All medications have been reviewed





Active Medications





Acetaminophen (Acetaminophen Tab 325 Mg Tab)  650 mg PO Q6HR PRN


   PRN Reason: Mild Pain or Fever > 100.5


Hydrocodone Bitart/Acetaminophen (Hydrocodone/Apap 5-325mg 1 Each Tab)  1 each 

PO Q6HR PRN


   PRN Reason: Pain


   Last Admin: 11/05/21 11:43 Dose:  1 each


   Documented by: 


Atorvastatin Calcium (Atorvastatin 20 Mg Tab)  20 mg PO DAILY [facility]


   Last Admin: 11/05/21 08:42 Dose:  20 mg


   Documented by: 


Carvedilol (Carvedilol 3.125 Mg Tab)  3.125 mg PO BID-W/MEALS [facility]


   Last Admin: 11/05/21 08:42 Dose:  3.125 mg


   Documented by: 


Cholecalciferol (Cholecalciferol 25 Mcg (1000 Iu) Tablet)  25 mcg PO DAILY [facility]


   Last Admin: 11/05/21 08:46 Dose:  25 mcg


   Documented by: 


Ciprofloxacin (Ciprofloxacin Hcl 500 Mg Tab)  500 mg PO BID [facility]


   Last Admin: 11/05/21 11:40 Dose:  500 mg


   Documented by: 


Digoxin (Digoxin 125 Mcg Tab)  125 mcg PO DAILY [facility]


   Last Admin: 11/05/21 08:45 Dose:  125 mcg


   Documented by: 


Fenofibrate (Fenofibrate 160 Mg Tab)  160 mg PO DAILY [facility]


   Last Admin: 11/05/21 08:43 Dose:  160 mg


   Documented by: 


Glimepiride (Glimepiride 4 Mg Tab)  4 mg PO BID [facility]


   Last Admin: 11/05/21 08:44 Dose:  4 mg


   Documented by: 


Heparin Sodium (Porcine) (Heparin Sodium,Porcine/Pf 5,000 Unit/0.5 Ml Syringe)  

5,000 unit SQ Q12HR [facility]


   Last Admin: 11/05/21 09:13 Dose:  5,000 unit


   Documented by: 


Ampicillin Sodium/Sulbactam (Sodium 3 gm/ Sodium Chloride)  100 mls @ 200 mls/hr

IVPB Q6H [facility]


   Last Admin: 11/05/21 13:52 Dose:  200 mls/hr


   Documented by: 


Insulin Aspart (Insulin Aspart (Novolog) 100 Unit/Ml Vial)  0 unit SQ ACHS [facility]; 

Protocol


   Last Admin: 11/05/21 12:27 Dose:  Not Given


   Documented by: 


Insulin Detemir (Insulin Detemir (Levemir) 100 Unit/Ml Syr)  15 unit SQ HS [facility]


   Last Admin: 11/04/21 22:39 Dose:  15 unit


   Documented by: 


Melatonin (Melatonin 3 Mg Tablet)  3 mg PO HS PRN


   PRN Reason: Insomnia


Morphine Sulfate (Morphine Sulfate Er 15 Mg Tablet)  15 mg PO BID [facility]; Protocol


   Last Admin: 11/05/21 08:42 Dose:  15 mg


   Documented by: 


Naloxone HCl (Naloxone 0.4 Mg/Ml 1 Ml Vial)  0.2 mg IV Q2M PRN


   PRN Reason: Opioid Reversal


Ondansetron HCl (Ondansetron 4 Mg/2 Ml Vial)  4 mg IVP Q8HR PRN


   PRN Reason: Nausea And Vomiting


Pantoprazole Sodium (Pantoprazole 40 Mg Tablet)  40 mg PO AC-BRKFST [facility]


   Last Admin: 11/05/21 08:43 Dose:  40 mg


   Documented by: 


Pregabalin (Pregabalin 75 Mg Cap)  75 mg PO BID [facility]


   Last Admin: 11/05/21 08:42 Dose:  75 mg


   Documented by: 


Senna (Sennosides 8.6 Mg Tab)  8.6 mg PO BID [facility]


   Last Admin: 11/05/21 08:41 Dose:  8.6 mg


   Documented by: 


Tamsulosin HCl (Tamsulosin 0.4 Mg Cap.Er.24h)  0.4 mg PO DAILY [facility]


   Last Admin: 11/05/21 08:43 Dose:  0.4 mg


   Documented by: 


Trazodone HCl (Trazodone Hcl 50 Mg Tab)  50 mg PO HS [facility]


   Last Admin: 11/04/21 22:36 Dose:  50 mg


   Documented by: 











Physical exam:





Gen: This is a 86-year-old male awake, alert and oriented 3, well-developed, 

well-nourished.  Currently sitting up in the bed


HEENT: Head is atraumatic, normocephalic. Pupils equal, round. Sclerae is 

anicteric. 


NECK: Supple. No JVD. No lymphadenopathy. No thyromegaly. 


LUNGS: Clear to auscultation. No wheezes or rhonchi.  No intercostal 

retractions.


HEART: S1, S2 are muffled


ABDOMEN: Soft. Bowel sounds are present. No masses.  No tenderness.


EXTREMITIES: Right foot status post right great toe amputation and surgical 

dressing is dry and intact.  No calf tenderness.


NEUROLOGICAL: Patient is awake, alert and oriented x3. Cranial nerves 2 through 

12 are grossly intact. 





Assessment:





Right great toe gangrene with surrounding cellulitis


Status post right great toe amputation


Diabetes mellitus type 2, uncontrolled with hyperglycemia


Acute kidney injury most likely prerenal, improving


Possible osteomyelitis of the right toe


Chronic low back pain


Chronic heart failure, EF unknown


Hypertension


Peripheral neuropathy


Permanent pacemaker placement


Past medical history of myocardial infarction with stent placement


History of anxiety/depression


GI prophylaxis: Protonix


DVT prophylaxis: Subcutaneous heparin


No code





Plan:





Recommend to continue with current medications and patient is status post right 

great toe amputation.  Infectious disease following and patient is maintained on

IV Unasyn along with oral Cipro while awaiting for finalized cultures.  Prelim

inary cultures showing presumptive staph aureus with gram-negative bacilli and 

group D enterococcus and Aeromonas hydrophila and awaiting finalized cultures.  

Vascular surgery Dr. Champagne following as well.  Plan is for dressing changes 

today.  We'll have PT/OT evaluate the patient for possible ECF placement.  

Patient is adamant about going home and is agreeable with home care and does not

want to go to rehab.  Patient needs to remain nonweightbearing on that right 

lower extremity.  Will discuss further with infectious disease about discharge 

planning and possible IV antibiotic therapy once cultures have finalized. 

Patient states he would like to go home on discharge and will discuss further 

with case management and social work.








Objective





- Vital Signs


Vital signs: 


                                   Vital Signs











Temp  99.0 F   11/05/21 07:33


 


Pulse  82   11/05/21 07:33


 


Resp  14   11/05/21 07:33


 


BP  106/68   11/05/21 07:33


 


Pulse Ox  92 L  11/05/21 04:59








                                 Intake & Output











 11/04/21 11/05/21 11/05/21





 18:59 06:59 18:59


 


Intake Total 300 590 


 


Output Total 250 600 


 


Balance 50 -10 


 


Weight 86 kg  


 


Intake:   


 


  Intake, IV Titration 200  





  Amount   


 


    Ampicillin-Sulbactam 3 gm 200  





    In Sodium Chloride 0.9%   





    100 ml @ 200 mls/hr IVPB   





    Q6H [facility] Rx#:[id number]   


 


  Oral 100 590 


 


Output:   


 


  Urine 250 600 


 


Other:   


 


  Voiding Method Toilet Urinal 





 Urinal  














- Labs


CBC & Chem 7: 


                                 11/03/21 09:09





                                 11/05/21 06:27


Labs: 


                  Abnormal Lab Results - Last 24 Hours (Table)











  11/04/21 11/04/21 11/04/21 Range/Units





  08:44 11:36 17:07 


 


Carbon Dioxide  31 H    (22-30)  mmol/L


 


BUN  32 H    (9-20)  mg/dL


 


Glucose  127 H    (74-99)  mg/dL


 


POC Glucose (mg/dL)   135 H  288 H  (75-99)  mg/dL














  11/04/21 11/05/21 Range/Units





  20:03 06:27 


 


Carbon Dioxide    (22-30)  mmol/L


 


BUN   29 H  (9-20)  mg/dL


 


Glucose    (74-99)  mg/dL


 


POC Glucose (mg/dL)  232 H   (75-99)  mg/dL








                      Microbiology - Last 24 Hours (Table)











 11/02/21 15:30 Anaerobic Culture - Preliminary





 Toe - Right First 


 


 11/02/21 15:30 Anaerobic Culture - Preliminary





 Toe - Right First 


 


 11/01/21 19:07 Blood Culture - Preliminary





 Blood    No Growth after 72 hours


 


 11/02/21 15:30 Gram Stain - Preliminary





 Toe - Right First Tissue Culture - Preliminary





    Presumptive Staph aureus





    Aeromonas hydrophila





    Group D Enterococcus





    Gram Neg Bacilli


 


 11/02/21 15:30 Gram Stain - Preliminary





 Toe - Right First Wound Culture - Preliminary





    Presumptive Staph aureus





    Aeromonas hydrophila


 


 11/02/21 15:30 Gram Stain - Preliminary





 Toe - Right First Wound Culture - Preliminary





    Presumptive Staph aureus





    Aeromonas hydrophila

## 2021-11-06 LAB
GLUCOSE BLD-MCNC: 185 MG/DL (ref 75–99)
GLUCOSE BLD-MCNC: 189 MG/DL (ref 75–99)
GLUCOSE BLD-MCNC: 281 MG/DL (ref 75–99)
GLUCOSE BLD-MCNC: 53 MG/DL (ref 75–99)
GLUCOSE BLD-MCNC: 81 MG/DL (ref 75–99)

## 2021-11-06 RX ADMIN — HYDROCODONE BITARTRATE AND ACETAMINOPHEN PRN EACH: 5; 325 TABLET ORAL at 17:43

## 2021-11-06 RX ADMIN — INSULIN ASPART SCH UNIT: 100 INJECTION, SOLUTION INTRAVENOUS; SUBCUTANEOUS at 20:54

## 2021-11-06 RX ADMIN — AMPICILLIN SODIUM AND SULBACTAM SODIUM SCH MLS/HR: 2; 1 INJECTION, POWDER, FOR SOLUTION INTRAMUSCULAR; INTRAVENOUS at 09:05

## 2021-11-06 RX ADMIN — GLIMEPIRIDE SCH MG: 4 TABLET ORAL at 09:08

## 2021-11-06 RX ADMIN — AMPICILLIN SODIUM AND SULBACTAM SODIUM SCH MLS/HR: 2; 1 INJECTION, POWDER, FOR SOLUTION INTRAMUSCULAR; INTRAVENOUS at 12:59

## 2021-11-06 RX ADMIN — INSULIN ASPART SCH: 100 INJECTION, SOLUTION INTRAVENOUS; SUBCUTANEOUS at 09:06

## 2021-11-06 RX ADMIN — INSULIN DETEMIR SCH UNIT: 100 INJECTION, SOLUTION SUBCUTANEOUS at 20:59

## 2021-11-06 RX ADMIN — MORPHINE SULFATE SCH MG: 15 TABLET, EXTENDED RELEASE ORAL at 20:54

## 2021-11-06 RX ADMIN — FENOFIBRATE SCH MG: 160 TABLET ORAL at 09:08

## 2021-11-06 RX ADMIN — SENNOSIDES SCH MG: 8.6 TABLET, FILM COATED ORAL at 20:54

## 2021-11-06 RX ADMIN — TAMSULOSIN HYDROCHLORIDE SCH MG: 0.4 CAPSULE ORAL at 09:10

## 2021-11-06 RX ADMIN — AMPICILLIN SODIUM AND SULBACTAM SODIUM SCH MLS/HR: 2; 1 INJECTION, POWDER, FOR SOLUTION INTRAMUSCULAR; INTRAVENOUS at 20:55

## 2021-11-06 RX ADMIN — CIPROFLOXACIN SCH MG: 500 TABLET, FILM COATED ORAL at 09:07

## 2021-11-06 RX ADMIN — GLIMEPIRIDE SCH MG: 4 TABLET ORAL at 20:54

## 2021-11-06 RX ADMIN — HEPARIN SODIUM SCH UNIT: 5000 INJECTION INTRAVENOUS; SUBCUTANEOUS at 09:08

## 2021-11-06 RX ADMIN — MORPHINE SULFATE SCH MG: 15 TABLET, EXTENDED RELEASE ORAL at 09:08

## 2021-11-06 RX ADMIN — PANTOPRAZOLE SODIUM SCH MG: 40 TABLET, DELAYED RELEASE ORAL at 09:07

## 2021-11-06 RX ADMIN — INSULIN ASPART SCH UNIT: 100 INJECTION, SOLUTION INTRAVENOUS; SUBCUTANEOUS at 17:41

## 2021-11-06 RX ADMIN — ATORVASTATIN CALCIUM SCH MG: 20 TABLET, FILM COATED ORAL at 09:07

## 2021-11-06 RX ADMIN — AMPICILLIN SODIUM AND SULBACTAM SODIUM SCH MLS/HR: 2; 1 INJECTION, POWDER, FOR SOLUTION INTRAMUSCULAR; INTRAVENOUS at 01:36

## 2021-11-06 RX ADMIN — AMPICILLIN SODIUM AND SULBACTAM SODIUM SCH MLS/HR: 2; 1 INJECTION, POWDER, FOR SOLUTION INTRAMUSCULAR; INTRAVENOUS at 13:01

## 2021-11-06 RX ADMIN — CIPROFLOXACIN SCH MG: 500 TABLET, FILM COATED ORAL at 20:54

## 2021-11-06 RX ADMIN — HEPARIN SODIUM SCH UNIT: 5000 INJECTION INTRAVENOUS; SUBCUTANEOUS at 20:54

## 2021-11-06 RX ADMIN — PREGABALIN SCH MG: 75 CAPSULE ORAL at 20:54

## 2021-11-06 RX ADMIN — PREGABALIN SCH MG: 75 CAPSULE ORAL at 09:10

## 2021-11-06 RX ADMIN — INSULIN ASPART SCH UNIT: 100 INJECTION, SOLUTION INTRAVENOUS; SUBCUTANEOUS at 13:31

## 2021-11-06 RX ADMIN — DIGOXIN SCH MCG: 125 TABLET ORAL at 09:08

## 2021-11-06 RX ADMIN — SENNOSIDES SCH MG: 8.6 TABLET, FILM COATED ORAL at 09:10

## 2021-11-06 RX ADMIN — Medication SCH MCG: at 09:08

## 2021-11-07 LAB
ANION GAP SERPL CALC-SCNC: 10.1 MMOL/L (ref 4–12)
BASOPHILS # BLD AUTO: 0 K/UL (ref 0–0.2)
BASOPHILS NFR BLD AUTO: 0 %
BUN SERPL-SCNC: 30 MG/DL (ref 9–27)
BUN/CREAT SERPL: 21.43 RATIO (ref 12–20)
CALCIUM SPEC-MCNC: 8.4 MG/DL (ref 8.7–10.3)
CHLORIDE SERPL-SCNC: 106 MMOL/L (ref 96–109)
CO2 SERPL-SCNC: 21.9 MMOL/L (ref 21.6–31.8)
EOSINOPHIL # BLD AUTO: 0.3 K/UL (ref 0–0.7)
EOSINOPHIL NFR BLD AUTO: 4 %
ERYTHROCYTE [DISTWIDTH] IN BLOOD BY AUTOMATED COUNT: 4.28 M/UL (ref 4.3–5.9)
ERYTHROCYTE [DISTWIDTH] IN BLOOD: 13.9 % (ref 11.5–15.5)
GLUCOSE BLD-MCNC: 104 MG/DL (ref 75–99)
GLUCOSE BLD-MCNC: 109 MG/DL (ref 75–99)
GLUCOSE BLD-MCNC: 193 MG/DL (ref 75–99)
GLUCOSE BLD-MCNC: 233 MG/DL (ref 75–99)
GLUCOSE BLD-MCNC: 50 MG/DL (ref 75–99)
GLUCOSE BLD-MCNC: 62 MG/DL (ref 75–99)
GLUCOSE SERPL-MCNC: 67 MG/DL (ref 70–110)
HCT VFR BLD AUTO: 37 % (ref 39–53)
HGB BLD-MCNC: 11.5 GM/DL (ref 13–17.5)
LYMPHOCYTES # SPEC AUTO: 0.8 K/UL (ref 1–4.8)
LYMPHOCYTES NFR SPEC AUTO: 11 %
MCH RBC QN AUTO: 27 PG (ref 25–35)
MCHC RBC AUTO-ENTMCNC: 31.1 G/DL (ref 31–37)
MCV RBC AUTO: 86.6 FL (ref 80–100)
MONOCYTES # BLD AUTO: 0.5 K/UL (ref 0–1)
MONOCYTES NFR BLD AUTO: 7 %
NEUTROPHILS # BLD AUTO: 5.4 K/UL (ref 1.3–7.7)
NEUTROPHILS NFR BLD AUTO: 76 %
PLATELET # BLD AUTO: 259 K/UL (ref 150–450)
POTASSIUM SERPL-SCNC: 4.5 MMOL/L (ref 3.5–5.5)
SODIUM SERPL-SCNC: 138 MMOL/L (ref 135–145)
WBC # BLD AUTO: 7.1 K/UL (ref 3.8–10.6)

## 2021-11-07 RX ADMIN — PREGABALIN SCH MG: 75 CAPSULE ORAL at 08:09

## 2021-11-07 RX ADMIN — CIPROFLOXACIN SCH MG: 500 TABLET, FILM COATED ORAL at 08:09

## 2021-11-07 RX ADMIN — SENNOSIDES SCH MG: 8.6 TABLET, FILM COATED ORAL at 20:52

## 2021-11-07 RX ADMIN — ATORVASTATIN CALCIUM SCH MG: 20 TABLET, FILM COATED ORAL at 08:09

## 2021-11-07 RX ADMIN — CIPROFLOXACIN SCH MG: 500 TABLET, FILM COATED ORAL at 20:52

## 2021-11-07 RX ADMIN — HYDROCODONE BITARTRATE AND ACETAMINOPHEN PRN EACH: 5; 325 TABLET ORAL at 14:09

## 2021-11-07 RX ADMIN — HYDROCODONE BITARTRATE AND ACETAMINOPHEN PRN EACH: 5; 325 TABLET ORAL at 03:56

## 2021-11-07 RX ADMIN — GLIMEPIRIDE SCH MG: 4 TABLET ORAL at 08:09

## 2021-11-07 RX ADMIN — INSULIN ASPART SCH UNIT: 100 INJECTION, SOLUTION INTRAVENOUS; SUBCUTANEOUS at 17:32

## 2021-11-07 RX ADMIN — HEPARIN SODIUM SCH UNIT: 5000 INJECTION INTRAVENOUS; SUBCUTANEOUS at 08:14

## 2021-11-07 RX ADMIN — AMPICILLIN SODIUM AND SULBACTAM SODIUM SCH MLS/HR: 2; 1 INJECTION, POWDER, FOR SOLUTION INTRAMUSCULAR; INTRAVENOUS at 13:10

## 2021-11-07 RX ADMIN — TAMSULOSIN HYDROCHLORIDE SCH MG: 0.4 CAPSULE ORAL at 08:09

## 2021-11-07 RX ADMIN — GLIMEPIRIDE SCH MG: 4 TABLET ORAL at 20:52

## 2021-11-07 RX ADMIN — SENNOSIDES SCH MG: 8.6 TABLET, FILM COATED ORAL at 08:09

## 2021-11-07 RX ADMIN — AMPICILLIN SODIUM AND SULBACTAM SODIUM SCH MLS/HR: 2; 1 INJECTION, POWDER, FOR SOLUTION INTRAMUSCULAR; INTRAVENOUS at 08:16

## 2021-11-07 RX ADMIN — MORPHINE SULFATE SCH MG: 15 TABLET, EXTENDED RELEASE ORAL at 08:11

## 2021-11-07 RX ADMIN — INSULIN ASPART SCH: 100 INJECTION, SOLUTION INTRAVENOUS; SUBCUTANEOUS at 13:04

## 2021-11-07 RX ADMIN — AMPICILLIN SODIUM AND SULBACTAM SODIUM SCH MLS/HR: 2; 1 INJECTION, POWDER, FOR SOLUTION INTRAMUSCULAR; INTRAVENOUS at 03:56

## 2021-11-07 RX ADMIN — Medication SCH MCG: at 08:09

## 2021-11-07 RX ADMIN — INSULIN ASPART SCH: 100 INJECTION, SOLUTION INTRAVENOUS; SUBCUTANEOUS at 07:50

## 2021-11-07 RX ADMIN — DIGOXIN SCH MCG: 125 TABLET ORAL at 08:09

## 2021-11-07 RX ADMIN — PANTOPRAZOLE SODIUM SCH MG: 40 TABLET, DELAYED RELEASE ORAL at 08:09

## 2021-11-07 RX ADMIN — INSULIN ASPART SCH UNIT: 100 INJECTION, SOLUTION INTRAVENOUS; SUBCUTANEOUS at 20:52

## 2021-11-07 RX ADMIN — MORPHINE SULFATE SCH MG: 15 TABLET, EXTENDED RELEASE ORAL at 20:52

## 2021-11-07 RX ADMIN — PREGABALIN SCH MG: 75 CAPSULE ORAL at 20:52

## 2021-11-07 RX ADMIN — FENOFIBRATE SCH MG: 160 TABLET ORAL at 08:08

## 2021-11-07 RX ADMIN — HEPARIN SODIUM SCH UNIT: 5000 INJECTION INTRAVENOUS; SUBCUTANEOUS at 20:52

## 2021-11-07 RX ADMIN — AMPICILLIN SODIUM AND SULBACTAM SODIUM SCH MLS/HR: 2; 1 INJECTION, POWDER, FOR SOLUTION INTRAMUSCULAR; INTRAVENOUS at 20:51

## 2021-11-07 NOTE — P.PN
Subjective


Progress Note Date: 11/06/21


Principal diagnosis: 





Right great toe gangrene with diabetic foot infection.





This is an 86-year-old male who was recently admitted to [facility] for 

increasing pain of the right great toe and concern for cellulitis with gangrene.

 Patient was transferred here to [facility] for further evaluation and 

initially started on IV Unasyn and will continue with Unasyn and vancomycin and 

consult infectious disease.  Vascular surgery Dr. Champagne also consulted for 

possible amputation of the right great toe.  Patient states he has been 

following at the wound care center in [place] who has been treating the foot 

infection and has progressively gotten worse in transferred here for further 

evaluation.  Patient follows with  in the outpatient setting.  Shital saunders has a past medical history of heart failure, atrial fibrillation, CAD, 

arthritis, anxiety, depression diabetes mellitus type 2, I disorder, 

hypertension, myocardial infarction with stents in March 2021, permanent 

pacemaker placement in 2017 prostate cancer, and chronic back pain. patient also

follows at the pain clinic every 2 months for his chronic back pain and is 

prescribed MS Contin.  Patient denies having any right foot pain but his 

continued with chronic back pain during this hospitalization.  Right great toe 

is positive for necrotic tissue and eschar noted along with some surrounding 

cellulitis with redness and inflammation that is extending up the dorsal aspect 

of the right foot and stops midfoot.  Plan is for amputation of the right great 

toe with vascular surgery this afternoon.  Initial labs show a white blood count

of 6.2, hemoglobin is 13.0, platelets are 131, INR is 1.2, sodium is 135, 

potassium is 3.8, creatinine is 1.62, and BUN is 44, calcium is 9.0, phosphorus 

is 3.0, magnesium is 2.1, liver functions within normal limits.





11/03/2021


Patient is seen in follow-up status post right great toe amputation with 

vascular surgery Dr. Champagne yesterday.  Patient is having some back pain and 

normally maintained on morphine sulfate and will add low-dose Norco for breakt

hrough pain.  Patient has peripheral neuropathy and normally does not feel much 

of his feet and states since the surgery he is feeling some surrounding 

tenderness of the right foot.  Surgical dressing is Intact.  Blood sugars are 

mildly elevated and have resumed home medications and will continue sliding 

scale and add low-dose long-acting and continued Accu-Cheks before meals and at 

bedtime.





11/04/2021


Patient is seen and evaluated in follow-up this morning with no acute overnight 

issues.  Patient underwent right great toe amputation and is being followed by 

vascular surgery Dr. Champagne along with infectious disease.  Preliminary 

cultures showing presumptive staph aureus with gram-negative bacilli and group D

enterococcus and patient is maintained on IV antibiotics in the form of Unasyn a

nd vancomycin and will continue.  Awaiting for finalized cultures to determine 

discharge antibiotics.  PT/OT to evaluate the patient for possible ECF placement

and will discuss further with patient after he discusses with his family members

about this as he wants to go home.





11/05/2021


Patient is seen in follow-up this morning continued on IV antibiotics in the 

form of Unasyn with infectious disease following closely and vancomycin has been

discontinued.  Patient also continued on oral Cipro and awaiting finalized the 

tissue cultures.  Presumptive staph aureus and Aeromonas hydrophilia preliminary

showing on the wound culture and tissue culture showing presumptive staph aureus

with group D enterococcus and gram-negative bacilli.  Dr. Champagne also following

and planning on dressing changes today.  Vital signs within stable and blood 

sugars have been variable until today lower and will continue with sliding scale

along with long-acting and continue Accu-Cheks before meals and at bedtime.  

Patient denies any chest pain, shortness of breath, or palpitations.  Patient is

having low-grade intermittent fevers of 99.0-99.4.  Patient denies any nausea or

vomiting and tolerating diet.





Labs:


Sodium is 138, potassium is 4.3, BUN is 29, creatinine is 1.20, calcium is 8.8





11/6/2021


Patient is currently resting in the bed.  Awake alert and oriented.  Continued 

on antibiotics in the form of Unasyn as per ID recommendations.  No complaints 

of chest pain or shortness breath.  Patient is afebrile.  Wound cultures showed 

MSSA and multiple pathogens.








Review of systems:


Constitutional: No reports of fatigue, fever, or chills


Cardiovascular: No reports of chest pain or palpitations


Respiratory: No reports of shortness of breath or cough


GI: No reports of nausea, vomiting, or diarrhea


: No reports of dysuria or retention


Neurovascular: No reports of weakness or numbness





All medications have been reviewed





Objective





- Vital Signs


Vital signs: 


                                   Vital Signs











Temp  99.0 F   11/06/21 19:35


 


Pulse  80   11/06/21 19:35


 


Resp  18   11/06/21 19:35


 


BP  107/65   11/06/21 19:35


 


Pulse Ox  94 L  11/06/21 19:35








                                 Intake & Output











 11/06/21 11/06/21 11/07/21





 06:59 18:59 05:59


 


Intake Total 590 2820 


 


Output Total 450  200


 


Balance 140 2820 -200


 


Weight 93 kg  


 


Intake:   


 


  Intake, IV Titration  200 





  Amount   


 


    Ampicillin-Sulbactam 3 gm  200 





    In Sodium Chloride 0.9%   





    100 ml @ 200 mls/hr IVPB   





    Q6H [facility] Rx#:[id number]   


 


  Oral 590 2620 


 


Output:   


 


  Urine 450  200


 


Other:   


 


  Voiding Method  Urinal Urinal


 


  # Voids  4 














- Exam





Physical exam:





Gen: This is a 86-year-old male awake, alert and oriented 3, well-developed, 

well-nourished.  Currently sitting up in the bed


HEENT: Head is atraumatic, normocephalic. Pupils equal, round. Sclerae is 

anicteric. 


NECK: Supple. No JVD. No lymphadenopathy. No thyromegaly. 


LUNGS: Clear to auscultation. No wheezes or rhonchi.  No intercostal 

retractions.


HEART: S1, S2 are muffled


ABDOMEN: Soft. Bowel sounds are present. No masses.  No tenderness.


EXTREMITIES: Right foot status post right great toe amputation and surgical 

dressing is dry and intact.  No calf tenderness.


NEUROLOGICAL: Patient is awake, alert and oriented x3. Cranial nerves 2 through 

12 are grossly intact. 








- Labs


CBC & Chem 7: 


                                 11/07/21 06:23





                                 11/07/21 06:23


Labs: 


                  Abnormal Lab Results - Last 24 Hours (Table)











  11/06/21 11/06/21 11/06/21 Range/Units





  08:12 13:18 17:18 


 


POC Glucose (mg/dL)  53 L  189 H  185 H  (75-99)  mg/dL














  11/06/21 Range/Units





  20:08 


 


POC Glucose (mg/dL)  281 H  (75-99)  mg/dL








                      Microbiology - Last 24 Hours (Table)











 11/02/21 15:30 Gram Stain - Final





 Toe - Right First Wound Culture - Final





    Staphylococcus aureus





    Aeromonas hydrophila





    Enterococcus faecalis





    Enterobacter cloacae


 


 11/01/21 19:07 Blood Culture - Preliminary





 Blood    No Growth after 120 hours


 


 11/06/21 10:00 Wound Culture - Preliminary





 Incision 


 


 11/02/21 15:30 Anaerobic Culture - Final





 Toe - Right First    Anaerobic Gm Negative Bacilli


 


 11/02/21 15:30 Gram Stain - Final





 Toe - Right First Wound Culture - Final





    Staphylococcus aureus





    Aeromonas hydrophila





    Enterobacter cloacae


 


 11/02/21 15:30 Gram Stain - Final





 Toe - Right First Tissue Culture - Final





    Staphylococcus aureus





    Aeromonas hydrophila





    Enterococcus faecalis





    Enterobacter cloacae





    Non Hemolytic Strep














Assessment and Plan


Assessment: 








Assessment:





Right great toe gangrene, diabetic foot infectionwith surrounding cellulitis


Status post right great toe amputation


Diabetes mellitus type 2, uncontrolled with hyperglycemia


Acute kidney injury most likely prerenal, improving


Possible osteomyelitis of the right toe


Chronic low back pain


Chronic heart failure, EF unknown


Hypertension


Peripheral neuropathy


Permanent pacemaker placement


Past medical history of myocardial infarction with stent placement


History of anxiety/depression


GI prophylaxis: Protonix


DVT prophylaxis: Subcutaneous heparin


No code





Plan:





Recommend to continue with current medications and patient is status post right 

great toe amputation.  Infectious disease following and patient is maintained on

IV Unasyn along with oral Cipro while awaiting for finalized cultures.  

Preliminary cultures showing presumptive staph aureus with gram-negative bacilli

and group D enterococcus and Aeromonas hydrophila and awaiting finalized 

cultures.  Vascular surgery Dr. Champagne following as well. 


 We'll have PT/OT evaluate the patient for possible ECF placement.  Patient is 

adamant about going home and is agreeable with home care and does not want to go

to rehab.  Patient needs to remain nonweightbearing on that right lower 

extremity.  Will discuss further with infectious disease about discharge 

planning and possible IV antibiotic therapy once cultures have finalized. 

Patient states he would like to go home on discharge and will discuss further 

with case management and social work.





Time with Patient: Greater than 30

## 2021-11-07 NOTE — P.PN
Subjective


Progress Note Date: 11/07/21


Principal diagnosis: 





Right great toe gangrene with diabetic foot infection.





This is an 86-year-old male who was recently admitted to [facility] for 

increasing pain of the right great toe and concern for cellulitis with gangrene.

 Patient was transferred here to [facility] for further evaluation and 

initially started on IV Unasyn and will continue with Unasyn and vancomycin and 

consult infectious disease.  Vascular surgery Dr. Champagne also consulted for 

possible amputation of the right great toe.  Patient states he has been 

following at the wound care center in [place] who has been treating the foot 

infection and has progressively gotten worse in transferred here for further 

evaluation.  Patient follows with  in the outpatient setting.  Shital saunders has a past medical history of heart failure, atrial fibrillation, CAD, 

arthritis, anxiety, depression diabetes mellitus type 2, I disorder, 

hypertension, myocardial infarction with stents in March 2021, permanent 

pacemaker placement in 2017 prostate cancer, and chronic back pain. patient also

follows at the pain clinic every 2 months for his chronic back pain and is 

prescribed MS Contin.  Patient denies having any right foot pain but his 

continued with chronic back pain during this hospitalization.  Right great toe 

is positive for necrotic tissue and eschar noted along with some surrounding 

cellulitis with redness and inflammation that is extending up the dorsal aspect 

of the right foot and stops midfoot.  Plan is for amputation of the right great 

toe with vascular surgery this afternoon.  Initial labs show a white blood count

of 6.2, hemoglobin is 13.0, platelets are 131, INR is 1.2, sodium is 135, 

potassium is 3.8, creatinine is 1.62, and BUN is 44, calcium is 9.0, phosphorus 

is 3.0, magnesium is 2.1, liver functions within normal limits.





11/03/2021


Patient is seen in follow-up status post right great toe amputation with 

vascular surgery Dr. Champagne yesterday.  Patient is having some back pain and 

normally maintained on morphine sulfate and will add low-dose Norco for breakt

hrough pain.  Patient has peripheral neuropathy and normally does not feel much 

of his feet and states since the surgery he is feeling some surrounding 

tenderness of the right foot.  Surgical dressing is Intact.  Blood sugars are 

mildly elevated and have resumed home medications and will continue sliding 

scale and add low-dose long-acting and continued Accu-Cheks before meals and at 

bedtime.





11/04/2021


Patient is seen and evaluated in follow-up this morning with no acute overnight 

issues.  Patient underwent right great toe amputation and is being followed by 

vascular surgery Dr. Champagne along with infectious disease.  Preliminary 

cultures showing presumptive staph aureus with gram-negative bacilli and group D

enterococcus and patient is maintained on IV antibiotics in the form of Unasyn a

nd vancomycin and will continue.  Awaiting for finalized cultures to determine 

discharge antibiotics.  PT/OT to evaluate the patient for possible ECF placement

and will discuss further with patient after he discusses with his family members

about this as he wants to go home.





11/05/2021


Patient is seen in follow-up this morning continued on IV antibiotics in the 

form of Unasyn with infectious disease following closely and vancomycin has been

discontinued.  Patient also continued on oral Cipro and awaiting finalized the 

tissue cultures.  Presumptive staph aureus and Aeromonas hydrophilia preliminary

showing on the wound culture and tissue culture showing presumptive staph aureus

with group D enterococcus and gram-negative bacilli.  Dr. Champagne also following

and planning on dressing changes today.  Vital signs within stable and blood 

sugars have been variable until today lower and will continue with sliding scale

along with long-acting and continue Accu-Cheks before meals and at bedtime.  

Patient denies any chest pain, shortness of breath, or palpitations.  Patient is

having low-grade intermittent fevers of 99.0-99.4.  Patient denies any nausea or

vomiting and tolerating diet.





Labs:


Sodium is 138, potassium is 4.3, BUN is 29, creatinine is 1.20, calcium is 8.8





11/6/2021


Patient is currently resting in the bed.  Awake alert and oriented.  Continued 

on antibiotics in the form of Unasyn as per ID recommendations.  No complaints 

of chest pain or shortness breath.  Patient is afebrile.  Wound cultures showed 

MSSA and multiple pathogens.





11/7/2021


Patient is currently resting in bed comfortably.  Right foot pain is fairly 

controlled.  Continued on antibiotics involve Unasyn and possible change to oral

antibiotic course.  Follow-up final culture report.  Culture is growing gram-

negative bacilli.  Currently on local dressing and wound care.


Patient has been afebrile.  Lab data reviewed.





Review of systems:


Constitutional: No reports of fatigue, fever, or chills


Cardiovascular: No reports of chest pain or palpitations


Respiratory: No reports of shortness of breath or cough


GI: No reports of nausea, vomiting, or diarrhea


: No reports of dysuria or retention


Neurovascular: No reports of weakness or numbness





All medications have been reviewed





Objective





- Vital Signs


Vital signs: 


                                   Vital Signs











Temp  98.6 F   11/07/21 13:00


 


Pulse  80   11/07/21 17:33


 


Resp  19   11/07/21 13:00


 


BP  130/79   11/07/21 17:33


 


Pulse Ox  99   11/07/21 13:00








                                 Intake & Output











 11/06/21 11/07/21 11/07/21





 19:59 06:59 18:59


 


Intake Total   2100


 


Output Total   400


 


Balance   1700


 


Weight   


 


Intake:   


 


  Intake, IV Titration   200





  Amount   


 


    Ampicillin-Sulbactam 3 gm   





    In Sodium Chloride 0.9%   





    100 ml @ 200 mls/hr IVPB   





    Q6H [facility] Rx#:[id number]   


 


    Ampicillin-Sulbactam 3 gm   200





    In Sodium Chloride 0.9%   





    100 ml @ 200 mls/hr IVPB   





    Q8H [facility] Rx#:[id number]   


 


  Oral   1900


 


Output:   


 


  Urine   400


 


Other:   


 


  Voiding Method   Urinal


 


  # Voids   4














- Exam





Physical exam:





Gen: This is a 86-year-old male awake, alert and oriented 3, well-developed, 

well-nourished.  Currently sitting up in the bed


HEENT: Head is atraumatic, normocephalic. Pupils equal, round. Sclerae is 

anicteric. 


NECK: Supple. No JVD. No lymphadenopathy. No thyromegaly. 


LUNGS: Clear to auscultation. No wheezes or rhonchi.  No intercostal 

retractions.


HEART: S1, S2 are muffled


ABDOMEN: Soft. Bowel sounds are present. No masses.  No tenderness.


EXTREMITIES: Right foot status post right great toe amputation and surgical 

dressing is dry and intact.  No calf tenderness.


NEUROLOGICAL: Patient is awake, alert and oriented x3. Cranial nerves 2 through 

12 are grossly intact. 








- Labs


CBC & Chem 7: 


                                 11/07/21 06:23





                                 11/07/21 06:23


Labs: 


                  Abnormal Lab Results - Last 24 Hours (Table)











  11/06/21 11/07/21 11/07/21 Range/Units





  20:08 06:23 06:23 


 


RBC   4.28 L   (4.30-5.90)  m/uL


 


Hgb   11.5 L   (13.0-17.5)  gm/dL


 


Hct   37.0 L   (39.0-53.0)  %


 


Lymphocytes #   0.8 L   (1.0-4.8)  k/uL


 


BUN    30.0 H  (9.0-27.0)  mg/dL


 


Est GFR (CKD-EPI)AfAm    53.1 L  (60.0-200.0)   


 


Est GFR (CKD-EPI)NonAf    45.8 L  (60.0-200.0)   


 


BUN/Creatinine Ratio    21.43 H  (12.00-20.00)  Ratio


 


Glucose    67 L  ()  mg/dL


 


POC Glucose (mg/dL)  281 H    (75-99)  mg/dL


 


Calcium    8.4 L  (8.7-10.3)  mg/dL














  11/07/21 11/07/21 11/07/21 Range/Units





  07:26 07:45 08:07 


 


RBC     (4.30-5.90)  m/uL


 


Hgb     (13.0-17.5)  gm/dL


 


Hct     (39.0-53.0)  %


 


Lymphocytes #     (1.0-4.8)  k/uL


 


BUN     (9.0-27.0)  mg/dL


 


Est GFR (CKD-EPI)AfAm     (60.0-200.0)   


 


Est GFR (CKD-EPI)NonAf     (60.0-200.0)   


 


BUN/Creatinine Ratio     (12.00-20.00)  Ratio


 


Glucose     ()  mg/dL


 


POC Glucose (mg/dL)  50 L  62 L  104 H  (75-99)  mg/dL


 


Calcium     (8.7-10.3)  mg/dL














  11/07/21 11/07/21 Range/Units





  12:03 17:22 


 


RBC    (4.30-5.90)  m/uL


 


Hgb    (13.0-17.5)  gm/dL


 


Hct    (39.0-53.0)  %


 


Lymphocytes #    (1.0-4.8)  k/uL


 


BUN    (9.0-27.0)  mg/dL


 


Est GFR (CKD-EPI)AfAm    (60.0-200.0)   


 


Est GFR (CKD-EPI)NonAf    (60.0-200.0)   


 


BUN/Creatinine Ratio    (12.00-20.00)  Ratio


 


Glucose    ()  mg/dL


 


POC Glucose (mg/dL)  109 H  193 H  (75-99)  mg/dL


 


Calcium    (8.7-10.3)  mg/dL








                      Microbiology - Last 24 Hours (Table)











 11/06/21 10:00 Gram Stain - Preliminary





 Incision Wound Culture - Preliminary





    Gram Neg Bacilli


 


 11/02/21 15:30 Anaerobic Culture - Final





 Toe - Right First 


 


 11/02/21 15:30 Anaerobic Culture - Final





 Toe - Right First 


 


 11/02/21 15:30 Gram Stain - Final





 Toe - Right First Wound Culture - Final





    Staphylococcus aureus





    Aeromonas hydrophila





    Enterococcus faecalis





    Enterobacter cloacae


 


 11/01/21 19:07 Blood Culture - Preliminary





 Blood    No Growth after 120 hours














Assessment and Plan


Assessment: 








Assessment:





Right great toe gangrene, diabetic foot infectionwith surrounding cellulitis


Status post right great toe amputation


Diabetes mellitus type 2, uncontrolled with hyperglycemia


Acute kidney injury most likely prerenal, improving


Possible osteomyelitis of the right toe


Chronic low back pain


Chronic heart failure, EF unknown


Hypertension


Peripheral neuropathy


Permanent pacemaker placement


Past medical history of myocardial infarction with stent placement


History of anxiety/depression


GI prophylaxis: Protonix


DVT prophylaxis: Subcutaneous heparin


No code





Plan:





Recommend to continue with current medications and patient is status post right 

great toe amputation.  Infectious disease following and patient is maintained on

IV Unasyn along with oral Cipro while awaiting for finalized cultures.  P

reliminary cultures showing presumptive staph aureus with gram-negative bacilli 

and group D enterococcus and Aeromonas hydrophila and awaiting finalized 

cultures.  Vascular surgery Dr. Champagne following as well. 


 We'll have PT/OT evaluate the patient for possible ECF placement.  Patient is 

adamant about going home and is agreeable with home care and does not want to go

to rehab.  Patient needs to remain nonweightbearing on that right lower 

extremity.  Will discuss further with infectious disease about discharge 

planning and possible IV antibiotic therapy once cultures have finalized. 

Patient states he would like to go home on discharge and will discuss further 

with case management and social work.





Time with Patient: Greater than 30

## 2021-11-08 LAB
ANION GAP SERPL CALC-SCNC: 21.2 MMOL/L (ref 4–12)
BASOPHILS # BLD AUTO: 0 K/UL (ref 0–0.2)
BASOPHILS NFR BLD AUTO: 0 %
BUN SERPL-SCNC: 30.9 MG/DL (ref 9–27)
BUN/CREAT SERPL: 23.23 RATIO (ref 12–20)
CALCIUM SPEC-MCNC: 8.5 MG/DL (ref 8.7–10.3)
CHLORIDE SERPL-SCNC: 100 MMOL/L (ref 96–109)
CO2 SERPL-SCNC: 21.2 MMOL/L (ref 21.6–31.8)
EOSINOPHIL # BLD AUTO: 0.2 K/UL (ref 0–0.7)
EOSINOPHIL NFR BLD AUTO: 3 %
ERYTHROCYTE [DISTWIDTH] IN BLOOD BY AUTOMATED COUNT: 4.51 M/UL (ref 4.3–5.9)
ERYTHROCYTE [DISTWIDTH] IN BLOOD: 13.9 % (ref 11.5–15.5)
GLUCOSE BLD-MCNC: 148 MG/DL (ref 75–99)
GLUCOSE BLD-MCNC: 175 MG/DL (ref 75–99)
GLUCOSE BLD-MCNC: 178 MG/DL (ref 75–99)
GLUCOSE BLD-MCNC: 194 MG/DL (ref 75–99)
GLUCOSE SERPL-MCNC: 177 MG/DL (ref 70–110)
HCT VFR BLD AUTO: 39.3 % (ref 39–53)
HGB BLD-MCNC: 12.4 GM/DL (ref 13–17.5)
LYMPHOCYTES # SPEC AUTO: 0.8 K/UL (ref 1–4.8)
LYMPHOCYTES NFR SPEC AUTO: 14 %
MCH RBC QN AUTO: 27.4 PG (ref 25–35)
MCHC RBC AUTO-ENTMCNC: 31.5 G/DL (ref 31–37)
MCV RBC AUTO: 87.2 FL (ref 80–100)
MONOCYTES # BLD AUTO: 0.4 K/UL (ref 0–1)
MONOCYTES NFR BLD AUTO: 6 %
NEUTROPHILS # BLD AUTO: 4.4 K/UL (ref 1.3–7.7)
NEUTROPHILS NFR BLD AUTO: 74 %
PLATELET # BLD AUTO: 275 K/UL (ref 150–450)
POTASSIUM SERPL-SCNC: 5.1 MMOL/L (ref 3.5–5.5)
SODIUM SERPL-SCNC: 142 MMOL/L (ref 135–145)
WBC # BLD AUTO: 5.9 K/UL (ref 3.8–10.6)

## 2021-11-08 RX ADMIN — PANTOPRAZOLE SODIUM SCH MG: 40 TABLET, DELAYED RELEASE ORAL at 09:04

## 2021-11-08 RX ADMIN — Medication SCH MCG: at 09:04

## 2021-11-08 RX ADMIN — HEPARIN SODIUM SCH UNIT: 5000 INJECTION INTRAVENOUS; SUBCUTANEOUS at 09:06

## 2021-11-08 RX ADMIN — TAMSULOSIN HYDROCHLORIDE SCH MG: 0.4 CAPSULE ORAL at 09:04

## 2021-11-08 RX ADMIN — HEPARIN SODIUM SCH UNIT: 5000 INJECTION INTRAVENOUS; SUBCUTANEOUS at 21:30

## 2021-11-08 RX ADMIN — CIPROFLOXACIN SCH MG: 500 TABLET, FILM COATED ORAL at 09:09

## 2021-11-08 RX ADMIN — GLIMEPIRIDE SCH MG: 4 TABLET ORAL at 21:28

## 2021-11-08 RX ADMIN — MORPHINE SULFATE SCH MG: 15 TABLET, EXTENDED RELEASE ORAL at 21:29

## 2021-11-08 RX ADMIN — INSULIN DETEMIR SCH UNIT: 100 INJECTION, SOLUTION SUBCUTANEOUS at 09:07

## 2021-11-08 RX ADMIN — HYDROCODONE BITARTRATE AND ACETAMINOPHEN PRN EACH: 5; 325 TABLET ORAL at 16:25

## 2021-11-08 RX ADMIN — INSULIN ASPART SCH UNIT: 100 INJECTION, SOLUTION INTRAVENOUS; SUBCUTANEOUS at 12:30

## 2021-11-08 RX ADMIN — INSULIN ASPART SCH UNIT: 100 INJECTION, SOLUTION INTRAVENOUS; SUBCUTANEOUS at 12:44

## 2021-11-08 RX ADMIN — GLIMEPIRIDE SCH MG: 4 TABLET ORAL at 09:04

## 2021-11-08 RX ADMIN — MORPHINE SULFATE SCH MG: 15 TABLET, EXTENDED RELEASE ORAL at 09:05

## 2021-11-08 RX ADMIN — INSULIN ASPART SCH UNIT: 100 INJECTION, SOLUTION INTRAVENOUS; SUBCUTANEOUS at 09:07

## 2021-11-08 RX ADMIN — SENNOSIDES SCH MG: 8.6 TABLET, FILM COATED ORAL at 09:05

## 2021-11-08 RX ADMIN — INSULIN ASPART SCH: 100 INJECTION, SOLUTION INTRAVENOUS; SUBCUTANEOUS at 18:22

## 2021-11-08 RX ADMIN — AMPICILLIN SODIUM AND SULBACTAM SODIUM SCH MLS/HR: 2; 1 INJECTION, POWDER, FOR SOLUTION INTRAMUSCULAR; INTRAVENOUS at 12:47

## 2021-11-08 RX ADMIN — SENNOSIDES SCH MG: 8.6 TABLET, FILM COATED ORAL at 21:29

## 2021-11-08 RX ADMIN — PREGABALIN SCH MG: 75 CAPSULE ORAL at 09:04

## 2021-11-08 RX ADMIN — AMPICILLIN SODIUM AND SULBACTAM SODIUM SCH MLS/HR: 2; 1 INJECTION, POWDER, FOR SOLUTION INTRAMUSCULAR; INTRAVENOUS at 21:25

## 2021-11-08 RX ADMIN — AMPICILLIN SODIUM AND SULBACTAM SODIUM SCH MLS/HR: 2; 1 INJECTION, POWDER, FOR SOLUTION INTRAMUSCULAR; INTRAVENOUS at 05:16

## 2021-11-08 RX ADMIN — INSULIN ASPART SCH UNIT: 100 INJECTION, SOLUTION INTRAVENOUS; SUBCUTANEOUS at 21:30

## 2021-11-08 RX ADMIN — ATORVASTATIN CALCIUM SCH MG: 20 TABLET, FILM COATED ORAL at 09:08

## 2021-11-08 RX ADMIN — PREGABALIN SCH MG: 75 CAPSULE ORAL at 21:28

## 2021-11-08 RX ADMIN — CIPROFLOXACIN SCH MG: 500 TABLET, FILM COATED ORAL at 21:28

## 2021-11-08 RX ADMIN — DIGOXIN SCH MCG: 125 TABLET ORAL at 09:04

## 2021-11-08 RX ADMIN — FENOFIBRATE SCH MG: 160 TABLET ORAL at 09:04

## 2021-11-08 NOTE — P.CN
Psychiatric Consult





- .


Consult date: 11/08/21


Consult:: 





11/08/21 11:51


IDENTIFYING DATA: This patient is a 86-year-old  male who is currently 

 and lives with his wife in a house has 5 kids and 16 grandkids.  He is 

currently retired as an . 





REASON FOR REFERRAL: Psychiatry was consulted for "suicide ideations and 

depression"





HISTORY OF PRESENT ILLNESS: The patient presented to the hospital on 11/2 for 

gangrene on his right toe.  Patient apparently has been following up at the 

clinic with his doctor however the gangrene has worsened.  Patient was started 

on antibiotics IV.  Patient's nurse claims that patient was endorsing feeling 

sad and having relationship problems with his daughter and also his wife.  She 

had claimed that patient spoke about taking medications and not waking up.  He 

is currently on 11 sitter with suicidal precautions.  Patient was seen at the 

bedside and agreeable to speak to writer.  He claims that his right toes "turned

black" and he was getting concerned and came to the hospital.  He states that he

follows up with Dr. Champagne in the clinic.  He states that he also has been 

dealing with severe back pain and states that "it's very bad" and states that he

is going to a pain clinic and they're giving him "to morphine today".  He states

that he is having relationship issues with his wife and claims that he is 

feeling sad as he cannot speak with his grandchildren in the hospital.  He was 

complaining that the phone got taken away from him.  He states that "if they 

gave me a pill and I would die than "I would be okay with it".  He states that 

he does not want actively harm himself and states that he has to live for his 

family, grandchildren and also he is [employer] and states that "I want to go to 

heaven".  He claims that his appetite and sleep have been fair.  He states that 

he is feeling depressed and anxious.  He was agreeable to be started back on 

Cymbalta.  He states that he is dealing with significant health problems which 

are making him feel sad . At this time patient denies any homical ideations, 

intent or plan.  Current suicidal plan.  Patient denies any auditory, visual 

hallucinations and denies any paranoia or delusions. Patients admits to using 

recreational drugs.





Writer attempted to call patient's wife Tonya at [phone], wife states that

patient has been endorsing passive SI at home "praying to God that he doesnt 

want to wake up". This apparently has been chronic and related to relationship i

ssues. Guns have been taken from house by police before and states that there is

guns in the house locked in a caginet however she does not have the key. I 

informed her that the police need to be informd to take them away from the 

house, she acknowledged and states that she will contact them to have them 

removed again.





PAST PSYCHIATRIC HISTORY: Patient has a a history of depression. Patient is cu

rrently on cymbalta and trazodone. Patient denies any previous psychiatric 

hospitalizations. Patient denies any psychiatric outpatient follow-up.He is 

following up with his  Patient denies any history of suicide attempts in the 

past.





Past Medical History: Heart Failure, Diabetes Mellitus, Eye Disorder, 

Hypertension, Myocardial Infarction (MI), Prostate Disorder, Skin Disorder


Additional Past Medical History / Comment(s): MI's, stent, nueropathy, prostate 

cancer, cellulitis, chronic back pain





ALLERGIES: as per EMR. 





CHEMICAL DEPENDENCY HISTORY: as per HPI. 





FAMILY PSYCHIATRIC/SUBSTANCE USE HISTORY: denies





SOCIAL HISTORY: Patient was born and raised in [place].  He states that

he completed high school and attended college.  He states that he worked for the

[employer] as an .  He currently lives with his wife in a house 

has 5 kids.  He has 16 grandchildren..





MENTAL STATUS EXAM: 


General Appearance: Patient appears to be tall, stated age is alert, directable,

and cooperative. Patient appears to have fair hygiene and grooming wearing 

hospital gown with fair eye contact.


Behavior: Patient is calmly lying in bed without any agitated behavior.


Speech: Patient's speech is fluent and nonpressured. 


Mood/Affect: Patient reports their mood is "depressed", affect is congruent


Suicidality/Homicidality:  Patient denies having any homicidal ideation intent 

or plan.  He claims that he is having passive suicidal thoughts however no 

intent or plan. 


Perceptions: Patient denies any visual hallucinations and denies any auditory 

hallucinations  


Though content/process: There is no evidence of any delusional thought content 

and thought process is linear and goal-directed.  Rambles at times.  Focused on 

his stressors.


Memory and concentration: AOX3, grossly intact for the purposes of this session.

Can spell "WORLD" backwards


Judgment and insight: poor





IMPRESSIONS: 


Major depressive disorder, without psychotic features


Pain disorder with psychological factors





PLAN: 


-At this time patient DOES NOT meet criteria for inpatient psychiatric admission

however will continue to follow along to see if patient will need ana psych 

placement or not.


-Delirium precautions recommended with patient including - avoiding use of 

narcotics and CNS sedatives, limit anticholinergic medications when possible, 

frequent re-orientation, minimize use of restraints, open window shades during 

the day and close them at night


-Would recommend the following medication changes/additions: Restarted Cymbalta 

30 mg daily for mood/anxiety/mood.  Will attempt to titrate up as needed.  

Continue with trazodone 50 mg daily at bedtime for insomnia/mood.


-Continue 1:1 sitter for safety


-Will allow patient to use his cellular phone however she must be observed with 

it by the sitter at all times.


-Communicated plan to patient's nurse


-Will continue to follow along


-Please contact with any questions.

## 2021-11-09 VITALS
TEMPERATURE: 97.8 F | DIASTOLIC BLOOD PRESSURE: 79 MMHG | HEART RATE: 78 BPM | RESPIRATION RATE: 19 BRPM | SYSTOLIC BLOOD PRESSURE: 148 MMHG

## 2021-11-09 LAB
ANION GAP SERPL CALC-SCNC: 9.2 MMOL/L (ref 4–12)
BASOPHILS # BLD AUTO: 0.1 K/UL (ref 0–0.2)
BASOPHILS NFR BLD AUTO: 1 %
BUN SERPL-SCNC: 28 MG/DL (ref 9–27)
BUN/CREAT SERPL: 21.54 RATIO (ref 12–20)
CALCIUM SPEC-MCNC: 8.6 MG/DL (ref 8.7–10.3)
CHLORIDE SERPL-SCNC: 107 MMOL/L (ref 96–109)
CO2 SERPL-SCNC: 22.8 MMOL/L (ref 21.6–31.8)
EOSINOPHIL # BLD AUTO: 0.2 K/UL (ref 0–0.7)
EOSINOPHIL NFR BLD AUTO: 3 %
ERYTHROCYTE [DISTWIDTH] IN BLOOD BY AUTOMATED COUNT: 4.66 M/UL (ref 4.3–5.9)
ERYTHROCYTE [DISTWIDTH] IN BLOOD: 14.5 % (ref 11.5–15.5)
GLUCOSE BLD-MCNC: 101 MG/DL (ref 75–99)
GLUCOSE BLD-MCNC: 129 MG/DL (ref 75–99)
GLUCOSE BLD-MCNC: 76 MG/DL (ref 75–99)
GLUCOSE SERPL-MCNC: 79 MG/DL (ref 70–110)
HCT VFR BLD AUTO: 39.8 % (ref 39–53)
HGB BLD-MCNC: 12.7 GM/DL (ref 13–17.5)
LYMPHOCYTES # SPEC AUTO: 0.8 K/UL (ref 1–4.8)
LYMPHOCYTES NFR SPEC AUTO: 12 %
MCH RBC QN AUTO: 27.2 PG (ref 25–35)
MCHC RBC AUTO-ENTMCNC: 31.9 G/DL (ref 31–37)
MCV RBC AUTO: 85.3 FL (ref 80–100)
MONOCYTES # BLD AUTO: 0.5 K/UL (ref 0–1)
MONOCYTES NFR BLD AUTO: 7 %
NEUTROPHILS # BLD AUTO: 5.4 K/UL (ref 1.3–7.7)
NEUTROPHILS NFR BLD AUTO: 76 %
PLATELET # BLD AUTO: 331 K/UL (ref 150–450)
POTASSIUM SERPL-SCNC: 4.7 MMOL/L (ref 3.5–5.5)
SODIUM SERPL-SCNC: 139 MMOL/L (ref 135–145)
WBC # BLD AUTO: 7.1 K/UL (ref 3.8–10.6)

## 2021-11-09 RX ADMIN — CIPROFLOXACIN SCH MG: 500 TABLET, FILM COATED ORAL at 08:36

## 2021-11-09 RX ADMIN — HEPARIN SODIUM SCH UNIT: 5000 INJECTION INTRAVENOUS; SUBCUTANEOUS at 09:32

## 2021-11-09 RX ADMIN — HYDROCODONE BITARTRATE AND ACETAMINOPHEN PRN EACH: 5; 325 TABLET ORAL at 05:26

## 2021-11-09 RX ADMIN — INSULIN DETEMIR SCH: 100 INJECTION, SOLUTION SUBCUTANEOUS at 09:50

## 2021-11-09 RX ADMIN — INSULIN ASPART SCH: 100 INJECTION, SOLUTION INTRAVENOUS; SUBCUTANEOUS at 12:37

## 2021-11-09 RX ADMIN — DIGOXIN SCH MCG: 125 TABLET ORAL at 08:36

## 2021-11-09 RX ADMIN — GLIMEPIRIDE SCH MG: 4 TABLET ORAL at 08:36

## 2021-11-09 RX ADMIN — AMPICILLIN SODIUM AND SULBACTAM SODIUM SCH MLS/HR: 2; 1 INJECTION, POWDER, FOR SOLUTION INTRAMUSCULAR; INTRAVENOUS at 05:34

## 2021-11-09 RX ADMIN — MORPHINE SULFATE SCH MG: 15 TABLET, EXTENDED RELEASE ORAL at 08:32

## 2021-11-09 RX ADMIN — INSULIN ASPART SCH: 100 INJECTION, SOLUTION INTRAVENOUS; SUBCUTANEOUS at 08:31

## 2021-11-09 RX ADMIN — AMPICILLIN SODIUM AND SULBACTAM SODIUM SCH MLS/HR: 2; 1 INJECTION, POWDER, FOR SOLUTION INTRAMUSCULAR; INTRAVENOUS at 12:41

## 2021-11-09 RX ADMIN — FENOFIBRATE SCH MG: 160 TABLET ORAL at 08:33

## 2021-11-09 RX ADMIN — PANTOPRAZOLE SODIUM SCH MG: 40 TABLET, DELAYED RELEASE ORAL at 08:32

## 2021-11-09 RX ADMIN — SENNOSIDES SCH MG: 8.6 TABLET, FILM COATED ORAL at 08:33

## 2021-11-09 RX ADMIN — ATORVASTATIN CALCIUM SCH MG: 20 TABLET, FILM COATED ORAL at 08:32

## 2021-11-09 RX ADMIN — PREGABALIN SCH: 75 CAPSULE ORAL at 08:32

## 2021-11-09 RX ADMIN — Medication SCH MCG: at 08:32

## 2021-11-09 RX ADMIN — TAMSULOSIN HYDROCHLORIDE SCH MG: 0.4 CAPSULE ORAL at 08:32

## 2021-11-09 NOTE — P.PN
Progress Note - Text


Progress Note Date: 11/09/21





Interval History:


Patient was seen today for psychiatric follow-up regarding patient's depressive 

disorder and suicidal thoughts.  Patient appears to have improvement in his 

affect today.  Nursing care patient denies any complaints and patient states 

that he has been doing well and cooperative.  Patient claims that he was able to

sleep better last night.  He reassured writer several times that he is not 

feeling suicidal and has a lot to live for.  He mentioned his family several 

times it spoke about his grandchildren whom he loves.  He also continues to 

speak about his Buddhist and him wanting to go to have been.  He states that he 

will not go to [facility] if he ever commit suicide.  He states that he is not 

having any anxiety today.  He claims that he did have some pain earlier this 

morning.. At this time patient denies any suicidal or homical ideations, intent 

or plan. Patient denies any auditory, visual hallucinations and denies any 

paranoia or delusions. Patient denies any side effects from the medications and 

has been compliant with meds. 





Mental Status Exam:


general Appearance: Patient appears to be tall, stated age is alert, directable,

and cooperative. Patient appears to have fair hygiene and grooming wearing 

hospital gown with fair eye contact.


Behavior: Patient is calmly lying in bed without any agitated behavior.  

Cooperative today


Speech: Patient's speech is fluent and nonpressured. 


Mood/Affect: Patient reports their mood is "good", affect is congruent and has a

bright affect


Suicidality/Homicidality:  Patient denies having any homicidal ideation intent 

or plan.  He is denying any suicidal thoughts today no intent or plan.


Perceptions: Patient denies any visual hallucinations and denies any auditory 

hallucinations  


Though content/process: There is no evidence of any delusional thought content 

and thought process is linear and goal-directed.  Rambles at times.  Future 

oriented


Memory and concentration: AOX3, grossly intact for the purposes of this session.

Can spell "WORLD" backwards


Judgment and insight: Improved





IMPRESSIONS: 


Major depressive disorder, without psychotic features


Pain disorder with psychological factors





PLAN: 


-At this time patient DOES NOT meet criteria for inpatient psychiatric admission

however will continue to follow along to see if patient will need ana psych 

placement or not.


-Delirium precautions recommended with patient including - avoiding use of 

narcotics and CNS sedatives, limit anticholinergic medications when possible, 

frequent re-orientation, minimize use of restraints, open window shades during 

the day and close them at night


-Would recommend the following medication changes/additions: Cymbalta 30 mg d

aily for mood/anxiety/mood. Continue with trazodone 50 mg daily at bedtime for 

insomnia/mood.


-Okay to discontinue one-to-one sitter today as patient is not endorsing 

suicidal thoughts and contracts for safety.


-Communicated plan to patient's nurse


-At this time psychiatry will sign off


-Please contact with any questions.

## 2021-11-09 NOTE — P.PN
Subjective


Progress Note Date: 11/08/21


Principal diagnosis: 





Right great toe gangrene with diabetic foot infection.





This is an 86-year-old male who was recently admitted to [facility] for 

increasing pain of the right great toe and concern for cellulitis with gangrene.

 Patient was transferred here to [facility] for further evaluation and 

initially started on IV Unasyn and will continue with Unasyn and vancomycin and 

consult infectious disease.  Vascular surgery Dr. Champagne also consulted for 

possible amputation of the right great toe.  Patient states he has been 

following at the wound care center in [place] who has been treating the foot 

infection and has progressively gotten worse in transferred here for further 

evaluation.  Patient follows with  in the outpatient setting.  Shital saunders has a past medical history of heart failure, atrial fibrillation, CAD, 

arthritis, anxiety, depression diabetes mellitus type 2, I disorder, 

hypertension, myocardial infarction with stents in March 2021, permanent 

pacemaker placement in 2017 prostate cancer, and chronic back pain. patient also

follows at the pain clinic every 2 months for his chronic back pain and is 

prescribed MS Contin.  Patient denies having any right foot pain but his 

continued with chronic back pain during this hospitalization.  Right great toe 

is positive for necrotic tissue and eschar noted along with some surrounding 

cellulitis with redness and inflammation that is extending up the dorsal aspect 

of the right foot and stops midfoot.  Plan is for amputation of the right great 

toe with vascular surgery this afternoon.  Initial labs show a white blood count

of 6.2, hemoglobin is 13.0, platelets are 131, INR is 1.2, sodium is 135, 

potassium is 3.8, creatinine is 1.62, and BUN is 44, calcium is 9.0, phosphorus 

is 3.0, magnesium is 2.1, liver functions within normal limits.





11/03/2021


Patient is seen in follow-up status post right great toe amputation with 

vascular surgery Dr. Champagne yesterday.  Patient is having some back pain and 

normally maintained on morphine sulfate and will add low-dose Norco for breakt

hrough pain.  Patient has peripheral neuropathy and normally does not feel much 

of his feet and states since the surgery he is feeling some surrounding 

tenderness of the right foot.  Surgical dressing is Intact.  Blood sugars are 

mildly elevated and have resumed home medications and will continue sliding 

scale and add low-dose long-acting and continued Accu-Cheks before meals and at 

bedtime.





11/04/2021


Patient is seen and evaluated in follow-up this morning with no acute overnight 

issues.  Patient underwent right great toe amputation and is being followed by 

vascular surgery Dr. Champagne along with infectious disease.  Preliminary 

cultures showing presumptive staph aureus with gram-negative bacilli and group D

enterococcus and patient is maintained on IV antibiotics in the form of Unasyn a

nd vancomycin and will continue.  Awaiting for finalized cultures to determine 

discharge antibiotics.  PT/OT to evaluate the patient for possible ECF placement

and will discuss further with patient after he discusses with his family members

about this as he wants to go home.





11/05/2021


Patient is seen in follow-up this morning continued on IV antibiotics in the 

form of Unasyn with infectious disease following closely and vancomycin has been

discontinued.  Patient also continued on oral Cipro and awaiting finalized the 

tissue cultures.  Presumptive staph aureus and Aeromonas hydrophilia preliminary

showing on the wound culture and tissue culture showing presumptive staph aureus

with group D enterococcus and gram-negative bacilli.  Dr. Champagne also following

and planning on dressing changes today.  Vital signs within stable and blood 

sugars have been variable until today lower and will continue with sliding scale

along with long-acting and continue Accu-Cheks before meals and at bedtime.  

Patient denies any chest pain, shortness of breath, or palpitations.  Patient is

having low-grade intermittent fevers of 99.0-99.4.  Patient denies any nausea or

vomiting and tolerating diet.





Labs:


Sodium is 138, potassium is 4.3, BUN is 29, creatinine is 1.20, calcium is 8.8





11/6/2021


Patient is currently resting in the bed.  Awake alert and oriented.  Continued 

on antibiotics in the form of Unasyn as per ID recommendations.  No complaints 

of chest pain or shortness breath.  Patient is afebrile.  Wound cultures showed 

MSSA and multiple pathogens.





11/7/2021


Patient is currently resting in bed comfortably.  Right foot pain is fairly 

controlled.  Continued on antibiotics involve Unasyn and possible change to oral

antibiotic course.  Follow-up final culture report.  Culture is growing gram-

negative bacilli.  Currently on local dressing and wound care.


Patient has been afebrile.  Lab data reviewed.





11/8/21


Patient currently resting in the bed.  Patient was agitated and delirious.  P

sychiatry was consulted for possible depression as well.


Otherwise patient is on IV antibiotics.  Status post amputation of the great 

toe.  Anticipate discharge with oral antibiotics in the next 24 hours to rehab.





Review of systems:


Constitutional: No reports of fatigue, fever, or chills


Cardiovascular: No reports of chest pain or palpitations


Respiratory: No reports of shortness of breath or cough


GI: No reports of nausea, vomiting, or diarrhea


: No reports of dysuria or retention


Neurovascular: No reports of weakness or numbness





All medications have been reviewed





Objective





- Vital Signs


Vital signs: 


                                   Vital Signs











Temp  98.1 F   11/08/21 12:57


 


Pulse  80   11/08/21 12:57


 


Resp  17   11/08/21 12:57


 


BP  119/70   11/08/21 12:57


 


Pulse Ox  94 L  11/08/21 12:57








                                 Intake & Output











 11/07/21 11/08/21 11/08/21





 18:59 06:59 18:59


 


Intake Total 2100 200 


 


Output Total 400 400 


 


Balance 1700 -200 


 


Weight   98.5 kg


 


Intake:   


 


  Intake, IV Titration 200 200 





  Amount   


 


    Ampicillin-Sulbactam 3 gm 200 200 





    In Sodium Chloride 0.9%   





    100 ml @ 200 mls/hr IVPB   





    Q8H AUNDREA Rx#:[id number]   


 


  Oral 1900  


 


Output:   


 


  Urine 400 400 


 


Other:   


 


  Voiding Method Urinal Toilet 





  Urinal 


 


  # Voids 4  














- Exam





Physical exam:





Gen: This is a 86-year-old male awake, alert and oriented 3, well-developed, 

well-nourished.  Currently sitting up in the bed


HEENT: Head is atraumatic, normocephalic. Pupils equal, round. Sclerae is 

anicteric. 


NECK: Supple. No JVD. No lymphadenopathy. No thyromegaly. 


LUNGS: Clear to auscultation. No wheezes or rhonchi.  No intercostal 

retractions.


HEART: S1, S2 are muffled


ABDOMEN: Soft. Bowel sounds are present. No masses.  No tenderness.


EXTREMITIES: Right foot status post right great toe amputation and surgical d

ressing is dry and intact.  No calf tenderness.


NEUROLOGICAL: Patient is awake, alert and oriented x3. Cranial nerves 2 through 

12 are grossly intact. 








- Labs


CBC & Chem 7: 


                                 11/09/21 05:19





                                 11/09/21 05:19


Labs: 


                  Abnormal Lab Results - Last 24 Hours (Table)











  11/07/21 11/08/21 11/08/21 Range/Units





  20:37 05:42 05:42 


 


Hgb   12.4 L   (13.0-17.5)  gm/dL


 


Lymphocytes #   0.8 L   (1.0-4.8)  k/uL


 


Carbon Dioxide    21.2 L  (21.6-31.8)  mmol/L


 


Anion Gap    21.20 H  (4.00-12.00)  mmol/L


 


BUN    30.9 H  (9.0-27.0)  mg/dL


 


Est GFR (CKD-EPI)AfAm    56.5 L  (60.0-200.0)   


 


Est GFR (CKD-EPI)NonAf    48.7 L  (60.0-200.0)   


 


BUN/Creatinine Ratio    23.23 H  (12.00-20.00)  Ratio


 


Glucose    177 H  ()  mg/dL


 


POC Glucose (mg/dL)  233 H    (75-99)  mg/dL


 


Calcium    8.5 L  (8.7-10.3)  mg/dL














  11/08/21 11/08/21 11/08/21 Range/Units





  07:40 12:34 17:04 


 


Hgb     (13.0-17.5)  gm/dL


 


Lymphocytes #     (1.0-4.8)  k/uL


 


Carbon Dioxide     (21.6-31.8)  mmol/L


 


Anion Gap     (4.00-12.00)  mmol/L


 


BUN     (9.0-27.0)  mg/dL


 


Est GFR (CKD-EPI)AfAm     (60.0-200.0)   


 


Est GFR (CKD-EPI)NonAf     (60.0-200.0)   


 


BUN/Creatinine Ratio     (12.00-20.00)  Ratio


 


Glucose     ()  mg/dL


 


POC Glucose (mg/dL)  148 H  194 H  175 H  (75-99)  mg/dL


 


Calcium     (8.7-10.3)  mg/dL








                      Microbiology - Last 24 Hours (Table)











 11/06/21 10:00 Gram Stain - Final





 Incision Wound Culture - Final





    Aeromonas hydrophila


 


 11/01/21 19:07 Blood Culture - Final





 Blood    No Growth after 144 hours














Assessment and Plan


Assessment: 








Assessment:





Right great toe gangrene, diabetic foot infectionwith surrounding cellulitis


Status post right great toe amputation


Diabetes mellitus type 2, uncontrolled with hyperglycemia


Acute kidney injury most likely prerenal, improving


Possible osteomyelitis of the right toe


Chronic low back pain


Chronic heart failure, EF unknown


Hypertension


Peripheral neuropathy


Permanent pacemaker placement


Past medical history of myocardial infarction with stent placement


History of anxiety/depression


GI prophylaxis: Protonix


DVT prophylaxis: Subcutaneous heparin


No code





Plan:





Recommend to continue with current medications and patient is status post right 

great toe amputation.  Infectious disease following and patient is maintained on

IV Unasyn along with oral Cipro while awaiting for finalized cultures.  P

reliminary cultures showing presumptive staph aureus with gram-negative bacilli 

and group D enterococcus and Aeromonas hydrophila and awaiting finalized 

cultures.  Vascular surgery Dr. Champagne following as well. 


 We'll have PT/OT evaluate the patient for possible ECF placement.  Patient is 

adamant about going home and is agreeable with home care and does not want to go

to rehab.  Patient needs to remain nonweightbearing on that right lower 

extremity.  Will discuss further with infectious disease about discharge 

planning and possible IV antibiotic therapy once cultures have finalized. 

Patient states he would like to go home on discharge and will discuss further 

with case management and social work.

## 2021-11-09 NOTE — P.DS
Providers


Date of admission: 


11/01/21 18:03





Expected date of discharge: 11/09/21


Attending physician: 


Katia Walls





Consults: 





                                        





11/01/21 18:47


Consult Physician Urgent 


   Consulting Provider: Yumiko Sutherland


   Consult Reason/Comments: gangrenous great toe


   Do you want consulting provider notified?: Yes





11/01/21 18:52


Consult Physician Urgent 


   Consulting Provider: Nilay Champagne


   Consult Reason/Comments: right great toe gangrene


   Do you want consulting provider notified?: Yes





11/08/21 09:38


Consult Physician Routine 


   Consulting Provider: Quinn Saez


   Consult Reason/Comments: suicidal ideations, depression


   Do you want consulting provider notified?: Already Contacted











Primary care physician: 


Thiago Black





Hospital Course: 





Discharge diagnosis


Right great toe gangrene, diabetic foot infectionwith surrounding cellulitis. \


Status post right great toe amputation


Diabetes mellitus type 2, uncontrolled with hyperglycemia


Acute kidney injury most likely prerenal, improving


Possible osteomyelitis of the right toe


Chronic low back pain


Chronic heart failure, EF unknown


Hypertension


Peripheral neuropathy


Permanent pacemaker placement


Past medical history of myocardial infarction with stent placement


History of anxiety/depression


GI prophylaxis: Protonix


DVT prophylaxis: Subcutaneous heparin


No code








hospital course





This is an 86-year-old male who was recently admitted to [facility] for 

increasing pain of the right great toe and concern for cellulitis with gangrene.

 Patient was transferred here to [facility] for further evaluation and 

initially started on IV Unasyn and will continue with Unasyn and vancomycin and 

consult infectious disease.  Vascular surgery Dr. Champagne also consulted for 

possible amputation of the right great toe.  Patient states he has been 

following at the wound care center in [place] who has been treating the foot 

infection and has progressively gotten worse in transferred here for further 

evaluation.  Patient follows with  in the outpatient setting.  

Patient has a past medical history of heart failure, atrial fibrillation, CAD, 

arthritis, anxiety, depression diabetes mellitus type 2, I disorder, hype

rtension, myocardial infarction with stents in March 2021, permanent pacemaker 

placement in 2017 prostate cancer, and chronic back pain. patient also follows 

at the pain clinic every 2 months for his chronic back pain and is prescribed MS

Contin.  Patient denies having any right foot pain but his continued with 

chronic back pain during this hospitalization.  Right great toe is positive for 

necrotic tissue and eschar noted along with some surrounding cellulitis with 

redness and inflammation that is extending up the dorsal aspect of the right 

foot and stops midfoot.  Plan is for amputation of the right great toe with 

vascular surgery this afternoon.  Initial labs show a white blood count of 6.2, 

hemoglobin is 13.0, platelets are 131, INR is 1.2, sodium is 135, potassium is 

3.8, creatinine is 1.62, and BUN is 44, calcium is 9.0, phosphorus is 3.0, 

magnesium is 2.1, liver functions within normal limits.





11/03/2021


Patient is seen in follow-up status [facility].  Patient is having some back pain and 

normally maintained on morphine sulfate and will add low-dose Norco for 

breakthrough pain.  Patient has peripheral neuropathy and normally does not feel

much of his feet and states since the surgery he is feeling some surrounding 

tenderness of the right foot.  Surgical dressing is Intact.  Blood sugars are 

mildly elevated and have resumed home medications and will continue sliding 

scale and add low-dose long-acting and continued Accu-Cheks before meals and at 

bedtime.





11/04/2021


Patient is seen and evaluated in follow-up this morning with no acute overnight 

issues.  Patient underwent right great toe amputation and is being followed by 

vascular surgery Dr. Champagne along with infectious disease.  Preliminary culture

s showing presumptive staph aureus with gram-negative bacilli and group D 

enterococcus and patient is maintained on IV antibiotics in the form of Unasyn 

and vancomycin and will continue.  Awaiting for finalized cultures to determine 

discharge antibiotics.  PT/OT to evaluate the patient for possible ECF placement

and will discuss further with patient after he discusses with his family members

about this as he wants to go home.





11/05/2021


Patient is seen in follow-up this morning continued on IV antibiotics in the 

form of Unasyn with infectious disease following closely and vancomycin has been

discontinued.  Patient also continued on oral Cipro and awaiting finalized the 

tissue cultures.  Presumptive staph aureus and Aeromonas hydrophilia preliminary

showing on the wound culture and tissue culture showing presumptive staph aureus

with group D enterococcus and gram-negative bacilli.  Dr. Champagne also following

and planning on dressing changes today.  Vital signs within stable and blood s

ugars have been variable until today lower and will continue with sliding scale 

along with long-acting and continue Accu-Cheks before meals and at bedtime.  

Patient denies any chest pain, shortness of breath, or palpitations.  Patient is

having low-grade intermittent fevers of 99.0-99.4.  Patient denies any nausea or

vomiting and tolerating diet.





Labs:


Sodium is 138, potassium is 4.3, BUN is 29, creatinine is 1.20, calcium is 8.8





11/6/2021


Patient is currently resting in the bed.  Awake alert and oriented.  Continued 

on antibiotics in the form of Unasyn as per ID recommendations.  No complaints 

of chest pain or shortness breath.  Patient is afebrile.  Wound cultures showed 

MSSA and multiple pathogens.





11/7/2021


Patient is currently resting in bed comfortably.  Right foot pain is fairly 

controlled.  Continued on antibiotics involve Unasyn and possible change to oral

antibiotic course.  Follow-up final culture report.  Culture is growing gram-

negative bacilli.  Currently on local dressing and wound care.


Patient has been afebrile.  Lab data reviewed.





11/8/21


Patient currently resting in the bed.  Patient was agitated and delirious.  

Psychiatry was consulted for possible depression as well.


Otherwise patient is on IV antibiotics.  Status post amputation of the great 

toe.  Anticipate discharge with oral antibiotics in the next 24 hours to rehab.





11/9/2021


Patient is currently resting in the bed comfortable.  Awake alert.  Status post 

amputation of the great toe.  Cultures growing multiple organisms.  Patient will

be continued on oral antibiotics in the form of Augmentin and Cipro as per ID 

recommendations.


Patient has been afebrile.  Mentation is much improved.  Patient is being 

discharged to rehab.





PHYSICAL EXAMINATION: 


Patient is lying in the bed comfortably, no acute distress, awake alert and 

oriented.. 


HEENT: Normocephalic. Neck is supple. Pupils reactive. Nostrils clear. Oral 

cavity is moist. 


Neck reveals no JVD, carotid bruits, or thyromegaly. 


CHEST EXAMINATION: Trachea is central. Symmetrical expansion. Lung fields clear 

to auscultation and percussion. 


CARDIAC: Normal S1, S2 with no gallops. No murmurs 


ABDOMEN: Soft. Bowel sounds normal. No organomegaly. No abdominal bruits. 


Extremities: reveal no edema.  No clubbing or cyanosis


Right greater amputation site is intact.  No discharge.


Neurologically awake, alert, oriented x2-3 with well-coordinated movements.  No 

focal deficits noted


Skin: No rash or skin lesions. 


Psychiatric: Coperative.  Nonsuicidal


Musculoskeletal: No joint swelling or deformity.  Normal range of motion.








                               Vital Signs - 8 hr











  11/09/21 11/09/21





  07:44 12:40


 


Temperature 97.9 F 97.8 F


 


Pulse Rate [ 81 78





Pulse Oximetery  





]  


 


Respiratory 20 19





Rate  


 


Blood Pressure 123/79 148/79





[Right Arm]  


 


O2 Sat by Pulse  94 L





Oximetry  











Total time taken greater than 35 minutes including 18 minutes for counseling and

coordination of care.


Patient Condition at Discharge: Good





Plan - Discharge Summary


Discharge Rx Participant: Yes


New Discharge Prescriptions: 


New


   Ciprofloxacin HCl [Cipro] 500 mg PO BID 10 Days #20 tab


   Amoxic-Pot Clav 875-125Mg [Augmentin 875-125] 1 tab PO Q12HR 10 Days #20 tab





Continue


   traZODone HCL 50 mg PO HS


   Tamsulosin HCl [Flomax] 0.4 mg PO DAILY


   Rosuvastatin [Crestor] 10 mg PO DAILY


   Pregabalin [Lyrica] 75 mg PO BID


   Pantoprazole [Protonix] 40 mg PO DAILY


   Morphine Sulfate 15 mg PO BID


   Cholecalciferol [Vitamin D3 (25 Mcg = 1000 Iu)] 25 mcg PO DAILY


   Aspirin 81 mg PO DAILY


   Multivitamins, Thera [Multivitamin (formulary)] 1 tab PO DAILY


   Mometasone/Formoterol [Dulera 100 Mcg-5 Mcg Inhaler] 1 puff PO RT-BID


   Celecoxib [CeleBREX] 100 mg PO BID PRN


     PRN Reason: Pain


   Blue Emu Otc Cream 1 applic TOPICAL DAILY PRN


     PRN Reason: Pain


   carvediloL [Coreg] 3.125 mg PO BID


   Glimepiride [Amaryl] 4 mg PO BID


   Fenofibric Acid (Choline) [Trilipix] 135 mg PO DAILY


   Digoxin [Lanoxin] 125 mcg PO DAILY


   Bumetanide [BUMEX] 0.5 mg PO DAILY


   Lidocaine 5% Patch [Lidoderm 5% Patch] 1 patch TOPICAL DAILY PRN


     PRN Reason: Pain


   DULoxetine HCL [Cymbalta] 30 mg PO DAILY


Discharge Medication List





Aspirin 81 mg PO DAILY 11/01/21 [History]


Blue Emu Otc Cream 1 applic TOPICAL DAILY PRN 11/01/21 [History]


Bumetanide [BUMEX] 0.5 mg PO DAILY 11/01/21 [History]


Celecoxib [CeleBREX] 100 mg PO BID PRN 11/01/21 [History]


Cholecalciferol [Vitamin D3 (25 Mcg = 1000 Iu)] 25 mcg PO DAILY 11/01/21 

[History]


DULoxetine HCL [Cymbalta] 30 mg PO DAILY 11/01/21 [History]


Digoxin [Lanoxin] 125 mcg PO DAILY 11/01/21 [History]


Fenofibric Acid (Choline) [Trilipix] 135 mg PO DAILY 11/01/21 [History]


Glimepiride [Amaryl] 4 mg PO BID 11/01/21 [History]


Lidocaine 5% Patch [Lidoderm 5% Patch] 1 patch TOPICAL DAILY PRN 11/01/21 

[History]


Mometasone/Formoterol [Dulera 100 Mcg-5 Mcg Inhaler] 1 puff PO RT-BID 11/01/21 

[History]


Morphine Sulfate 15 mg PO BID 11/01/21 [History]


Multivitamins, Thera [Multivitamin (formulary)] 1 tab PO DAILY 11/01/21 

[History]


Pantoprazole [Protonix] 40 mg PO DAILY 11/01/21 [History]


Pregabalin [Lyrica] 75 mg PO BID 11/01/21 [History]


Rosuvastatin [Crestor] 10 mg PO DAILY 11/01/21 [History]


Tamsulosin HCl [Flomax] 0.4 mg PO DAILY 11/01/21 [History]


carvediloL [Coreg] 3.125 mg PO BID 11/01/21 [History]


traZODone HCL 50 mg PO HS 11/01/21 [History]


Amoxic-Pot Clav 875-125Mg [Augmentin 875-125] 1 tab PO Q12HR 10 Days #20 tab 11/ 08/21 [Rx]


Ciprofloxacin HCl [Cipro] 500 mg PO BID 10 Days #20 tab 11/08/21 [Rx]








Follow up Appointment(s)/Referral(s): 


Souleymane Denverfroilan, [NON-STAFF] - 1 Week


Yumiko Sutherland MD [STAFF PHYSICIAN] - 1 Week


Discharge Disposition: TRANSFER TO SNF/F

## 2022-01-31 ENCOUNTER — HOSPITAL ENCOUNTER (OUTPATIENT)
Dept: HOSPITAL 47 - LABPAT | Age: 87
Discharge: HOME | End: 2022-01-31
Attending: UROLOGY
Payer: MEDICARE

## 2022-01-31 DIAGNOSIS — C61: ICD-10-CM

## 2022-01-31 DIAGNOSIS — Z01.812: Primary | ICD-10-CM

## 2022-01-31 LAB
ANION GAP SERPL CALC-SCNC: 14.9 MMOL/L (ref 10–18)
BASOPHILS # BLD AUTO: 0.02 X 10*3/UL (ref 0–0.1)
BASOPHILS NFR BLD AUTO: 0.3 %
BUN SERPL-SCNC: 41.5 MG/DL (ref 9–27)
BUN/CREAT SERPL: 28.62 RATIO (ref 12–20)
CALCIUM SPEC-MCNC: 9.6 MG/DL (ref 8.7–10.3)
CHLORIDE SERPL-SCNC: 101 MMOL/L (ref 96–109)
CO2 SERPL-SCNC: 23.2 MMOL/L (ref 20–27.5)
EOSINOPHIL # BLD AUTO: 0.24 X 10*3/UL (ref 0.04–0.35)
EOSINOPHIL NFR BLD AUTO: 3.5 %
ERYTHROCYTE [DISTWIDTH] IN BLOOD BY AUTOMATED COUNT: 5.33 X 10*6/UL (ref 4.4–5.6)
ERYTHROCYTE [DISTWIDTH] IN BLOOD: 15.5 % (ref 11.5–14.5)
GLUCOSE SERPL-MCNC: 223 MG/DL (ref 70–110)
HCT VFR BLD AUTO: 46 % (ref 39.6–50)
HGB BLD-MCNC: 13.9 G/DL (ref 13–17)
LYMPHOCYTES # SPEC AUTO: 1.15 X 10*3/UL (ref 0.9–5)
LYMPHOCYTES NFR SPEC AUTO: 16.6 %
MCH RBC QN AUTO: 26.1 PG (ref 27–32)
MCHC RBC AUTO-ENTMCNC: 30.2 G/DL (ref 32–37)
MCV RBC AUTO: 86.3 FL (ref 80–97)
MONOCYTES # BLD AUTO: 0.59 X 10*3/UL (ref 0.2–1)
MONOCYTES NFR BLD AUTO: 8.5 %
NEUTROPHILS # BLD AUTO: 4.88 X 10*3/UL (ref 1.8–7.7)
NEUTROPHILS NFR BLD AUTO: 70.2 %
PLATELET # BLD AUTO: 145 X 10*3/UL (ref 140–440)
POTASSIUM SERPL-SCNC: 4.3 MMOL/L (ref 3.5–5.5)
SODIUM SERPL-SCNC: 139 MMOL/L (ref 135–145)
WBC # BLD AUTO: 6.94 X 10*3/UL (ref 4.5–10)

## 2022-01-31 PROCEDURE — 80048 BASIC METABOLIC PNL TOTAL CA: CPT

## 2022-01-31 PROCEDURE — 85025 COMPLETE CBC W/AUTO DIFF WBC: CPT

## 2022-02-03 ENCOUNTER — HOSPITAL ENCOUNTER (OUTPATIENT)
Dept: HOSPITAL 47 - OR | Age: 87
Discharge: HOME | End: 2022-02-03
Attending: UROLOGY
Payer: MEDICARE

## 2022-02-03 VITALS — TEMPERATURE: 98.3 F | RESPIRATION RATE: 16 BRPM

## 2022-02-03 VITALS — HEART RATE: 82 BPM | SYSTOLIC BLOOD PRESSURE: 147 MMHG | DIASTOLIC BLOOD PRESSURE: 89 MMHG

## 2022-02-03 VITALS — BODY MASS INDEX: 23.3 KG/M2

## 2022-02-03 DIAGNOSIS — F32.A: ICD-10-CM

## 2022-02-03 DIAGNOSIS — Z79.899: ICD-10-CM

## 2022-02-03 DIAGNOSIS — M54.9: ICD-10-CM

## 2022-02-03 DIAGNOSIS — R41.3: ICD-10-CM

## 2022-02-03 DIAGNOSIS — Z95.5: ICD-10-CM

## 2022-02-03 DIAGNOSIS — L03.90: ICD-10-CM

## 2022-02-03 DIAGNOSIS — M19.90: ICD-10-CM

## 2022-02-03 DIAGNOSIS — H91.90: ICD-10-CM

## 2022-02-03 DIAGNOSIS — I11.0: ICD-10-CM

## 2022-02-03 DIAGNOSIS — F41.9: ICD-10-CM

## 2022-02-03 DIAGNOSIS — Z90.49: ICD-10-CM

## 2022-02-03 DIAGNOSIS — Z80.9: ICD-10-CM

## 2022-02-03 DIAGNOSIS — Z95.810: ICD-10-CM

## 2022-02-03 DIAGNOSIS — Z79.82: ICD-10-CM

## 2022-02-03 DIAGNOSIS — Z95.0: ICD-10-CM

## 2022-02-03 DIAGNOSIS — N28.9: ICD-10-CM

## 2022-02-03 DIAGNOSIS — J44.9: ICD-10-CM

## 2022-02-03 DIAGNOSIS — G89.29: ICD-10-CM

## 2022-02-03 DIAGNOSIS — Z79.84: ICD-10-CM

## 2022-02-03 DIAGNOSIS — Z87.891: ICD-10-CM

## 2022-02-03 DIAGNOSIS — Z97.2: ICD-10-CM

## 2022-02-03 DIAGNOSIS — Z98.890: ICD-10-CM

## 2022-02-03 DIAGNOSIS — Z90.5: ICD-10-CM

## 2022-02-03 DIAGNOSIS — I50.9: ICD-10-CM

## 2022-02-03 DIAGNOSIS — Z91.041: ICD-10-CM

## 2022-02-03 DIAGNOSIS — E11.40: ICD-10-CM

## 2022-02-03 DIAGNOSIS — C61: Primary | ICD-10-CM

## 2022-02-03 DIAGNOSIS — I25.2: ICD-10-CM

## 2022-02-03 LAB — GLUCOSE BLD-MCNC: 140 MG/DL (ref 75–99)

## 2022-02-03 PROCEDURE — 55874 TPRNL PLMT BIODEGRDABL MATRL: CPT

## 2022-02-03 PROCEDURE — 82525 ASSAY OF COPPER: CPT

## 2022-02-03 NOTE — P.GSHP
History of Present Illness


H&P Date: 02/03/22


Chief Complaint: Prostate cancer


The patient is an 86-year-old male with recently diagnosed prostate cancer.  His

PSA level is 19.33.  His prostate volume is 55 mL.  5 of 12 biopsies show 

Jenners 7 adenocarcinoma.  He is to be treated with IMRT and will undergo 

SpaceOAR implant to reduce rectal toxicity.





- Cardiovascular


Cardiovascular: Reports high blood pressure





- Genitourinary (Female)


Genitourinary: Reports nocturia





Past Medical History


Past Medical History: Cancer, Heart Failure, COPD, Diabetes Mellitus, Hearing 

Disorder / Deafness, Hypertension, Memory Impairment, Myocardial Infarction 

(MI), Osteoarthritis (OA), Prostate Disorder, Skin Disorder


Additional Past Medical History / Comment(s): MI's X3 (stents, Pacemaker/AICD-

pacemaker dependent), neuropathy, current prostate cancer, cellulitis, chronic 

back pain, "poor kidney function with one kidney", hard of hearing, "age related

memory problems."


Last Myocardial Infarction Date:: 2005


History of Any Multi-Drug Resistant Organisms: None Reported


Past Surgical History: AICD, Back Surgery, Cholecystectomy, Heart 

Catheterization, Heart Catheterization With Stent, Orthopedic Surgery, 

Pacemaker, Tonsillectomy


Additional Past Surgical History / Comment(s): Stents, pacemaker/defibrilator - 

Biotronic, left knee arthroscopy X2, great toe right foot amputated.


Past Anesthesia/Blood Transfusion Reactions: No Reported Reaction


Date of Last Stent Placement:: unknown


Type of Cardiac Device: Permanent Pacemaker, AICD


Device Placement Date:: unknown


Past Psychological History: Anxiety, Depression


Smoking Status: Former smoker


Past Alcohol Use History: None Reported


Additional Past Alcohol Use History / Comment(s): Quit smoking 40 yrs ago. Quit 

alcohol 30 yrs ago.


Past Drug Use History: None Reported





- Past Family History


  ** Mother


Family Medical History: Cancer





  ** Brother(s)


Family Medical History: Cancer


Additional Family Medical History / Comment(s): 2 brothers had cancer.





Medications and Allergies


                                Home Medications











 Medication  Instructions  Recorded  Confirmed  Type


 


Aspirin 81 mg PO DAILY 11/01/21 02/02/22 History


 


Blue Emu Otc Cream 1 applic TOPICAL DAILY PRN 11/01/21 02/02/22 History


 


Bumetanide [BUMEX] 0.5 mg PO DAILY 11/01/21 02/02/22 History


 


Celecoxib [CeleBREX] 100 mg PO BID PRN 11/01/21 02/02/22 History


 


Cholecalciferol [Vitamin D3 (25 25 mcg PO DAILY 11/01/21 02/02/22 History





Mcg = 1000 Iu)]    


 


DULoxetine HCL [Cymbalta] 30 mg PO DAILY 11/01/21 02/02/22 History


 


Digoxin [Lanoxin] 125 mcg PO DAILY 11/01/21 02/02/22 History


 


Fenofibric Acid (Choline) 135 mg PO DAILY 11/01/21 02/02/22 History





[Trilipix]    


 


Glimepiride [Amaryl] 4 mg PO BID 11/01/21 02/03/22 History


 


Lidocaine 5% Patch [Lidoderm 5% 1 patch TOPICAL DAILY PRN 11/01/21 02/02/22 

History





Patch]    


 


Mometasone/Formoterol [Dulera 100 2 puff PO BID 11/01/21 02/03/22 History





Mcg-5 Mcg Inhaler]    


 


Multivitamins, Thera [Multivitamin 1 tab PO DAILY 11/01/21 02/02/22 History





(formulary)]    


 


Rosuvastatin [Crestor] 10 mg PO DAILY 11/01/21 02/02/22 History


 


Tamsulosin HCl [Flomax] 0.4 mg PO DAILY 11/01/21 02/02/22 History


 


carvediloL [Coreg] 3.125 mg PO BID 11/01/21 02/03/22 History


 


traZODone HCL 50 mg PO HS 11/01/21 02/03/22 History


 


Morphine Sulfate 15 mg PO BID 3 Days #6 tab 11/09/21 02/02/22 Rx


 


Pregabalin [Lyrica] 75 mg PO BID 3 Days #6 cap 11/09/21 02/03/22 Rx


 


Empagliflozin [Jardiance] 10 mg PO DAILY 02/02/22 02/02/22 History


 


Fluticasone Nasal Spray [Flonase 1 spray EA NOSTRIL DAILY 02/02/22 02/03/22 

History





Nasal Spray]    


 


Nitroglycerin Sl Tabs [Nitrostat] 0.4 mg SUBLINGUAL Q5M PRN 02/02/22 02/02/22 

History


 


Omega-3 Fatty Acids/Fish Oil [Fish 1 each PO DAILY 02/02/22 02/03/22 History





Oil 1,000 mg Softgel]    








                                    Allergies











Allergy/AdvReac Type Severity Reaction Status Date / Time


 


contrast dye Allergy  Unknown Uncoded 02/02/22 08:47














Surgical - Exam


                                   Vital Signs











Temp Pulse Resp BP Pulse Ox


 


 98.1 F   80   18   144/80   95 


 


 02/03/22 06:49  02/03/22 06:49  02/03/22 06:49  02/03/22 06:49  02/03/22 06:49














- General


well developed, well nourished, no distress





- Respiratory


normal respiratory effort





- Abdomen


Abdomen: soft, non tender, no guarding, no rigid, no rebound





- Genitourinary


normal penis with no external lesions, testicles non-tender





- Psychiatric


oriented to time, oriented to person, oriented to place, speech is normal, 

memory intact





Assessment and Plan


(1) Malignant neoplasm of prostate


Current Visit: Yes   Status: Acute   Code(s): C61 - MALIGNANT NEOPLASM OF 

PROSTATE   SNOMED Code(s): 497471414


   


Plan: 


The SpaceOar implant has been reviewed in detail with the patient.  He 

understands that the rationale for this is to create separation between the 

prostate and rectum, thus reducing the risk of radiation proctitis.  The 

material begins to breakdown 12-13 weeks following implant, and is reabsorbed by

the body.  Risks include anesthesia, bleeding, infection, and perineal 

discomfort.  He understands that if the rectal wall is perforated the procedure 

will need to be aborted.

## 2022-02-03 NOTE — P.OP
Date of Procedure: 02/03/22


Preoperative Diagnosis: 


Adenocarcinoma the prostate


Postoperative Diagnosis: 


Same


Procedure(s) Performed: 


SpaceOAR Implant


Anesthesia: MAC


Surgeon: Mohan Perez


Estimated Blood Loss (ml): 10


IV fluids (ml): 300


Pathology: none sent


Condition: stable


Disposition: PACU


Indications for Procedure: 


The patient is an 86-year-old male with recently diagnosed prostate cancer.  His

PSA level is 19.33.  His prostate volume is 55 mL.  5 of 12 biopsies show Gleas

on 7 adenocarcinoma.  He is to be treated with IMRT and will undergo SpaceOAR 

implant to reduce rectal toxicity.


Operative Findings: 


0.75 cm created between prostate and rectum.


Description of Procedure: 


The patient was taken to the operating room and placed in the dorsolithotomy 

position, with his legs supported in Curt stirrups.  The external genitalia was

prepped and draped sterilely.  The Bruel and Kjaer transrectal ultrasound probe 

was placed intrarectally.  The prostate was imaged.  The probe was then placed 

within the stabilizing stand.  A spinal needle was advanced under ultrasonic 

guidance to the level of the urogenital diaphragm, and lidocaine was used to 

infiltrate the tissues as the needle was withdrawn.  Next, the SpaceOAR needle 

was passed through the midline of the perineum, 1-2 cm anterior to the anal 

opening.  The needle was slowly advanced under ultrasonic guidance until the 

needle tip was located within the fat plane between the prostate and rectum, at 

the level of the mid prostate gland.  The needle was confirmed to be midline on 

the axial imaging.  A small amount of normal saline was injected for hydrod

issection.  Next, the SpaceOAR components were mixed and loaded into the Y 

connector per protocol.  The Y connector was then connected to the needle, and 

the components were injected slowly over a course of approximately 12 seconds.  

A total of 10 ml was injected.  Significant distance was created between the 

prostate and rectum, as desired.  It should be noted that at no point was there 

any concern of rectal perforation.  The needle was withdrawn, as well as the 

transrectal ultrasound probe, and the procedure was terminated.  The patient 

tolerated the procedure well and was taken to the recovery room in stable 

condition.

## 2024-02-08 NOTE — PN
PROGRESS NOTE



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

Patient went for amputation of the right foot big toe at the metatarsophalangeal joint.

We did the primary closure.  The patient culture came back as Staph aureus under care

of infectious disease with IV antibiotic.  Today I have changed the dressing. I noted

in the middle of incision there is a small serous drainage noted.  If continues then we

may remove a few of the stitches and we will watch very closely.





MMODL / IJN: [id number] / Job#: [id number]
PROGRESS NOTE



DATE OF SERVICE:

11/03/2021



REASON FOR FOLLOWUP:

Right big toe gangrene.



INTERVAL HISTORY:

Patient is afebrile.  The patient is status post amputation right big toe completed

yesterday.  The patient tolerated the procedure.  Complaining of some sensitivity to

the area but no worsening pain.  No chest pain, shortness of breath or cough.  No

abdominal pain. No diarrhea.



PHYSICAL EXAMINATION:

Blood pressure 109/68 with a pulse of 77, temperature 98.4.  He is 96% on room air.

General description is an elderly male lying in bed in no distress.  Respiratory

system: Unlabored breathing. Clear to auscultation anteriorly.  Heart S1, S2.  Regular

rate and rhythm. Abdomen soft, no tenderness. Right big toe amputation site is

currently dressed.  No drainage on the dressing.



LABS:

Hemoglobin is 13.8, white count 7.9.  Creatinine is 1.42.  Cultures are currently

pending.



DIAGNOSTIC IMPRESSION AND PLAN:

Patient with right big toe gangrene status post amputation.  We will wait for the

culture to finalize.  Patient is covered with Unasyn and vancomycin to continue,

adjusting it further based on culture report and clinical response and monitor clinical

course closely.





MMODL / IJN: [id number] / Job#: [id number]
PROGRESS NOTE



DATE OF SERVICE:

11/04/2021



REASON FOR FOLLOWUP:

Right big toe osteomyelitis.



INTERVAL HISTORY:

The patient is afebrile. He is breathing comfortably.  Denies having any chest pain or

shortness of breath or cough.  No vomiting.  No abdominal pain or diarrhea.



PHYSICAL EXAMINATION:

Blood pressure 114/55, pulse of 93, temperature 98.1. He is 94% on room air.

General description is an elderly male lying in bed in no distress.

RESPIRATORY SYSTEM: Unlabored breathing. Clear to auscultation anteriorly.

HEART: S1, S2. Regular rate and rhythm.

ABDOMEN: Soft. No tenderness.

Right big toe amputation site wound is currently dressed.  No obvious drainage on the

dressing.



LABS:

BUN of 32. Creatinine is 1.23. Local culture with Staph aureus, Enterococcus.



DIAGNOSTIC IMPRESSION AND PLAN:

Patient with right big toe gangrene, status post right big toe amputation.  Culture

showing Staph aureus with sensitivities pending. Initially enterococcus.  Patient is

covered with Unasyn and vancomycin; to be continued while waiting for the culture to

finalize.  Monitor clinical course closely.





MMODL / IJN: [id number] / Job#: [id number]
PROGRESS NOTE



DATE OF SERVICE:

11/05/2021



REASON FOR FOLLOWUP:

Right big toe gangrene.



INTERVAL HISTORY:

The patient is afebrile.  The patient is breathing comfortably.  Denies any chest pain.

No shortness or cough. No abdominal pain or pain to the lower extremity.



PHYSICAL EXAMINATION:

Blood pressure 114/71 with a pulse of 81, temperature of 97.9, pulse ox 95% on room

air. General description is an elderly male lying in bed in no distress.  Respiratory

system: Unlabored breathing, clear to auscultation anteriorly.  Heart S1, S2.  Regular

rate and rhythm. Abdomen soft, no tenderness. Right foot amputation site currently

dressed up. No obvious drainage on the dressing.



LABS:

BUN 29, creatinine is 1.20.  Cultures have not been finalized.



DIAGNOSTIC IMPRESSION AND PLAN:

Patient with right big toe gangrene status post amputation.  Culture with multiple

pathogens including _____and Staph aureus, _____.  The patient covered with Unasyn,

Cipro has been added and vancomycin will be discontinued, planning for oral antibiotic

on discharge.  Continue supportive care.





MMODL / IJN: [id number] / Job#: [id number]
PROGRESS NOTE



DATE OF SERVICE:

11/06/2021



REASON FOR FOLLOWUP:

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



INTERVAL HISTORY:

The patient is afebrile.  The patient is currently breathing comfortably.  Pain to the

right foot is currently controlled.  No chest pain, shortness of breath or cough.  No

abdominal pain.  No diarrhea.



PHYSICAL EXAMINATION:

Blood pressure is 119/70 with a pulse of 80, temperature 98.  He is 94% on room air.

General description is an elderly male up in the chair in no distress.  Respiratory

system:  Unlabored breathing, clear to auscultation anteriorly.  Heart S1, S2.  Regular

rate and rhythm.  Abdomen soft, no tenderness.



LABS:

Culture has been positive for multiple pathogens including MSSA, Enterobacter and (  )

patient with right big toe gangrene status post amputation.  Cultures with multiple

pathogens.  Patient covered with Unasyn, cefazolin to continue.  Hopefully transition

to oral antibiotic on discharge.  Family at the bedside, multiple questions were

answered.





MMODL / IJN: [id number] / Job#: [id number]
PROGRESS NOTE



DATE OF SERVICE:

11/07/2021.



REASON FOR FOLLOWUP:

Right big toe gangrene.



INTERVAL HISTORY:

The patient is afebrile. The patient is breathing comfortably.  Pain in the right foot

is currently controlled.  Has been complaining of pain, mostly in the lower back area,

which is chronic for the patient. No chest pain, shortness of breath or cough.



PHYSICAL EXAMINATION:

Blood pressure 130/79 with a pulse of 83, temperature 98.6.  He is 99% on room air.

General description is an elderly male lying in bed in no distress. Respiratory system:

Unlabored breathing, decreased breath sounds at the base. No wheeze.  Heart S1, S2.

Regular rate and rhythm. Abdomen soft, no tenderness. Right foot is currently dressed.

Minimal drainage on the dressing.



LABS:

No new labs have been obtained today.



DIAGNOSTIC IMPRESSION AND PLAN:

Patient with right big toe gangrene, status post amputation.  Cultures with multiple

pathogens.  Patient is covered with Unasyn and Cipro _____tomorrow _____ dressing

change by the surgeon to determine plan and the antibiotic therapy on discharge.

Continue with supportive care.





MMODL / IJN: [id number] / Job#: [id number]
PROGRESS NOTE



DATE OF SERVICE:

11/08/2021



REASON FOR FOLLOWUP:

Right big toe gangrene and cellulitis.



INTERVAL HISTORY:

The patient is afebrile.  The patient is breathing comfortably.  Apparently the patient

seems to be slightly depressed and has made some statements that required placement of

a sitter and psych evaluation.  The patient mentioned he did not mean to do it. No

chest pain.  No abdominal pain or diarrhea.



PHYSICAL EXAMINATION:

Blood pressure 119/66, pulse of 80, temperature 98.5. He is 95% on room air. General

description is an elderly male up in the chair in no distress. Respiratory system:

Unlabored breathing, clear to auscultation anteriorly. Heart S1, S2.  Regular rate and

rhythm. Abdomen soft, no tenderness. Right foot is currently dressed. No drainage on

the dressing.  Dressing was changed by the surgeon earlier; overall resolution of

cellulitis.  Did have some dehiscence of the wound.



LABS:

White count normalized, 5.9.



DIAGNOSTIC IMPRESSION AND PLAN:

Patient with right big toe gangrene, status post amputation. Culture with multiple

pathogens. Planning for Augmentin and Cipro on discharge and close outpatient followup.





MMODL / IJN: [id number] / Job#: [id number]
PROGRESS NOTE



DATE OF SERVICE:

11/09/2021



REASON FOR FOLLOWUP:

Right big toe gangrene, status post amputation.



INTERVAL HISTORY:

The patient is afebrile, has been breathing comfortably.  No chest pain, shortness of

breath or cough. No abdominal pain or any worsening pain to the right foot amputation

site.



PHYSICAL EXAMINATION:

Blood pressure is 148/79 with a pulse of 98, temperature 97.8. He is 94% on room air.

General description is an elderly male up in the bed in no distress. Respiratory

system: Unlabored breathing, clear to auscultation anteriorly. Heart S1, S2.  Regular

rate and rhythm.  Abdomen soft, no tenderness.  Right foot is currently dressed up.  No

obvious drainage on the dressing.



LABS:

Hemoglobin is 12.6, white count 7.1. Creatinine is 1.3.



DIAGNOSTIC IMPRESSION AND PLAN:

Patient with right big toe gangrene, status post amputation of the right big toe.

Culture with multiple pathogens.  Patient is covered with Unasyn and Cipro.  Finish

therapy with oral Augmentin and Cipro once his psych condition stabilizes.  Continue

with supportive care.





MMODL / IJN: [id number] / Job#: [id number]
PROGRESS NOTE



Preoperative diagnosis was gangrene of the big toe. He underwent amputation of the big

toe with primary closure.  Today we have changed the dressing.  There is slight redness

noted along the incision site.  I advised the patient to be nonweightbearing. Culture

came back with presumptive Staph aureus and Gram-negative bacilli.  Patient is on IV

antibiotic, under the care of Infectious Disease. Plan is nonweightbearing.  Continue

with IV antibiotic and we will change the dressing tomorrow.





MMODL / IJN: [id number] / Job#: [id number]
PROGRESS NOTE



This is an 86 -year-old gentleman who came with right foot big toe gangrene.  Patient

went with amputation of the right foot big toe.  Culture came as Staph aureus.  Patient

is on IV antibiotic.  Plantar aspect of the incision there is some serous drainage

noted.  We removed the few stitches from the plantar aspect and took some deep culture.

Wound was irrigated with saline and we placed Aquacel silver and continue with IV

antibiotic.  We will change the dressing on Monday.





MMODL / IJN: [id number] / Job#: [id number]
PROGRESS NOTE



This is an 86-year-old gentleman.  Patient has gangrene of the right foot.  Patient

went for amputation.  Patient developed dehiscence on the plantar aspect of the wound

with some serous drainage.  We removed a few stitches, and today we have changed the

dressing. Dorsal aspect of the incision is healing.  Plantar aspect wound is open.  We

used Aquacel Silver for the wound.



PLAN:

Continue with antibiotic. When patient goes home, the patient will be followed in the

wound clinic. We used Aquacel Silver, which should be changed every 48 hours.

Discussed with the patient and the nurses.





MMODL / IJN: [id number] / Job#: [id number]
Stable